# Patient Record
Sex: FEMALE | Race: WHITE | NOT HISPANIC OR LATINO | Employment: FULL TIME | URBAN - METROPOLITAN AREA
[De-identification: names, ages, dates, MRNs, and addresses within clinical notes are randomized per-mention and may not be internally consistent; named-entity substitution may affect disease eponyms.]

---

## 2023-05-15 RX ORDER — DEXTROAMPHETAMINE SACCHARATE, AMPHETAMINE ASPARTATE MONOHYDRATE, DEXTROAMPHETAMINE SULFATE AND AMPHETAMINE SULFATE 3.75; 3.75; 3.75; 3.75 MG/1; MG/1; MG/1; MG/1
CAPSULE, EXTENDED RELEASE ORAL
Qty: 60 CAPSULE | OUTPATIENT
Start: 2023-05-15

## 2023-05-16 ENCOUNTER — TELEPHONE (OUTPATIENT)
Dept: PRIMARY CARE | Facility: CLINIC | Age: 33
End: 2023-05-16

## 2023-05-16 NOTE — TELEPHONE ENCOUNTER
Patient is calling to request a refill of her adderall 15 mg to be sent to the [facility] on [address] in [place]. She has an appointment scheduled for 6/16/23 with you. Please advise.

## 2023-05-17 DIAGNOSIS — F90.2 ATTENTION DEFICIT HYPERACTIVITY DISORDER (ADHD), COMBINED TYPE: Primary | ICD-10-CM

## 2023-05-17 RX ORDER — DEXTROAMPHETAMINE SACCHARATE, AMPHETAMINE ASPARTATE MONOHYDRATE, DEXTROAMPHETAMINE SULFATE AND AMPHETAMINE SULFATE 3.75; 3.75; 3.75; 3.75 MG/1; MG/1; MG/1; MG/1
15 CAPSULE, EXTENDED RELEASE ORAL 2 TIMES DAILY
Qty: 60 CAPSULE | Refills: 0 | Status: SHIPPED | OUTPATIENT
Start: 2023-05-17 | End: 2023-06-26

## 2023-05-17 RX ORDER — DEXTROAMPHETAMINE SACCHARATE, AMPHETAMINE ASPARTATE MONOHYDRATE, DEXTROAMPHETAMINE SULFATE AND AMPHETAMINE SULFATE 3.75; 3.75; 3.75; 3.75 MG/1; MG/1; MG/1; MG/1
15 CAPSULE, EXTENDED RELEASE ORAL 2 TIMES DAILY
COMMUNITY
End: 2023-05-17 | Stop reason: SDUPTHER

## 2023-06-16 ENCOUNTER — OFFICE VISIT (OUTPATIENT)
Dept: PRIMARY CARE | Facility: CLINIC | Age: 33
End: 2023-06-16
Payer: COMMERCIAL

## 2023-06-16 ENCOUNTER — LAB (OUTPATIENT)
Dept: LAB | Facility: LAB | Age: 33
End: 2023-06-16
Payer: COMMERCIAL

## 2023-06-16 VITALS
OXYGEN SATURATION: 98 % | TEMPERATURE: 98 F | WEIGHT: 259 LBS | HEART RATE: 97 BPM | DIASTOLIC BLOOD PRESSURE: 90 MMHG | SYSTOLIC BLOOD PRESSURE: 150 MMHG | BODY MASS INDEX: 43.77 KG/M2

## 2023-06-16 DIAGNOSIS — R06.02 SOB (SHORTNESS OF BREATH) ON EXERTION: ICD-10-CM

## 2023-06-16 DIAGNOSIS — N93.9 ABNORMAL UTERINE BLEEDING (AUB): ICD-10-CM

## 2023-06-16 DIAGNOSIS — R06.02 SOB (SHORTNESS OF BREATH) ON EXERTION: Primary | ICD-10-CM

## 2023-06-16 DIAGNOSIS — I10 BENIGN ESSENTIAL HYPERTENSION: ICD-10-CM

## 2023-06-16 PROBLEM — E55.9 VITAMIN D DEFICIENCY: Status: ACTIVE | Noted: 2023-06-16

## 2023-06-16 PROBLEM — K21.9 CHRONIC GERD: Status: ACTIVE | Noted: 2023-06-16

## 2023-06-16 PROBLEM — E66.9 OBESITY: Status: ACTIVE | Noted: 2023-06-16

## 2023-06-16 PROBLEM — F41.9 ANXIETY: Status: ACTIVE | Noted: 2023-06-16

## 2023-06-16 PROBLEM — D22.9 MULTIPLE NEVI: Status: ACTIVE | Noted: 2023-06-16

## 2023-06-16 PROBLEM — R73.9 HYPERGLYCEMIA: Status: ACTIVE | Noted: 2023-06-16

## 2023-06-16 PROBLEM — F98.8 ADD (ATTENTION DEFICIT DISORDER): Status: ACTIVE | Noted: 2023-06-16

## 2023-06-16 PROBLEM — G25.81 RESTLESS LEG SYNDROME: Status: ACTIVE | Noted: 2023-06-16

## 2023-06-16 LAB
ANION GAP IN SER/PLAS: 15 MMOL/L (ref 10–20)
CALCIUM (MG/DL) IN SER/PLAS: 10.5 MG/DL (ref 8.6–10.3)
CARBON DIOXIDE, TOTAL (MMOL/L) IN SER/PLAS: 28 MMOL/L (ref 21–32)
CHLORIDE (MMOL/L) IN SER/PLAS: 100 MMOL/L (ref 98–107)
CREATININE (MG/DL) IN SER/PLAS: 0.73 MG/DL (ref 0.5–1.05)
FOLLITROPIN (IU/L) IN SER/PLAS: 35.1 IU/L
GFR FEMALE: >90 ML/MIN/1.73M2
GLUCOSE (MG/DL) IN SER/PLAS: 91 MG/DL (ref 74–99)
LUTEINIZING HORMONE (IU/ML) IN SER/PLAS: 41.4 IU/L
POTASSIUM (MMOL/L) IN SER/PLAS: 3.9 MMOL/L (ref 3.5–5.3)
SODIUM (MMOL/L) IN SER/PLAS: 139 MMOL/L (ref 136–145)
THYROTROPIN (MIU/L) IN SER/PLAS BY DETECTION LIMIT <= 0.05 MIU/L: 2.75 MIU/L (ref 0.44–3.98)
UREA NITROGEN (MG/DL) IN SER/PLAS: 14 MG/DL (ref 6–23)

## 2023-06-16 PROCEDURE — 3080F DIAST BP >= 90 MM HG: CPT | Performed by: FAMILY MEDICINE

## 2023-06-16 PROCEDURE — 83002 ASSAY OF GONADOTROPIN (LH): CPT

## 2023-06-16 PROCEDURE — 83001 ASSAY OF GONADOTROPIN (FSH): CPT

## 2023-06-16 PROCEDURE — 3077F SYST BP >= 140 MM HG: CPT | Performed by: FAMILY MEDICINE

## 2023-06-16 PROCEDURE — 84443 ASSAY THYROID STIM HORMONE: CPT

## 2023-06-16 PROCEDURE — 93000 ELECTROCARDIOGRAM COMPLETE: CPT | Performed by: FAMILY MEDICINE

## 2023-06-16 PROCEDURE — 99214 OFFICE O/P EST MOD 30 MIN: CPT | Performed by: FAMILY MEDICINE

## 2023-06-16 PROCEDURE — 36415 COLL VENOUS BLD VENIPUNCTURE: CPT

## 2023-06-16 PROCEDURE — 80048 BASIC METABOLIC PNL TOTAL CA: CPT

## 2023-06-16 RX ORDER — CHLORTHALIDONE 50 MG/1
50 TABLET ORAL DAILY
Qty: 30 TABLET | Refills: 1 | Status: SHIPPED | OUTPATIENT
Start: 2023-06-16 | End: 2023-08-15

## 2023-06-16 RX ORDER — ROPINIROLE 1 MG/1
1 TABLET, FILM COATED ORAL NIGHTLY
COMMUNITY
Start: 2020-05-15

## 2023-06-16 RX ORDER — CHLORTHALIDONE 25 MG/1
25 TABLET ORAL DAILY
COMMUNITY
End: 2023-06-16 | Stop reason: SDUPTHER

## 2023-06-16 ASSESSMENT — PATIENT HEALTH QUESTIONNAIRE - PHQ9
SUM OF ALL RESPONSES TO PHQ9 QUESTIONS 1 AND 2: 0
2. FEELING DOWN, DEPRESSED OR HOPELESS: NOT AT ALL
1. LITTLE INTEREST OR PLEASURE IN DOING THINGS: NOT AT ALL

## 2023-06-16 NOTE — PROGRESS NOTES
Subjective   Patient ID: Maame Yen is a 33 y.o. female who presents for Follow-up (Med follow up) and BP/ elevated heart rate (X3 days. Some shortness of breath. 117bpm resting).    HPI   ADD  Taking Adderall twice a day as directed. She denies any side effects  last taken yesterday afternoon  She feels like her concentration is much better and she is much more productive  She does not take every day if she is not working or needing to get things accomplished on weekends  Sleep is good  Appetite is good  Mood is good      Restless leg -significantly improved on ropinirole. No concerns    Hypertension-taking chlorthalidone as directed without any side effects.   Swelling has been much better on new medication  Denies any chest pain, , dizziness, visual changes, headaches  still smoking   Feels like she is winded in last week or so  Apple watch has intermittently told her high HR around 110s at rest   No CP  No recent travel  No leg swelling   No ocp   No clotting hx   No recent home bp monitoring       Periods have not been regular for the last year. Last period was  2/23  Wondering about early menopause   Having some feeling of hot flashes     Review of Systems  As noted hpi otherwise 10 point ros neg     Objective   /90   Pulse 97   Temp 36.7 °C (98 °F)   Wt 117 kg (259 lb)   SpO2 98%   BMI 43.77 kg/m²     Physical Exam  Vitals and nursing note reviewed.   Constitutional:       Appearance: Normal appearance.   HENT:      Head: Normocephalic and atraumatic.      Right Ear: Tympanic membrane, ear canal and external ear normal.      Left Ear: Tympanic membrane, ear canal and external ear normal.      Nose: Nose normal.      Mouth/Throat:      Mouth: Mucous membranes are moist.      Pharynx: Oropharynx is clear.   Eyes:      Extraocular Movements: Extraocular movements intact.      Conjunctiva/sclera: Conjunctivae normal.      Pupils: Pupils are equal, round, and reactive to light.   Cardiovascular:       Rate and Rhythm: Normal rate and regular rhythm.      Pulses: Normal pulses.      Heart sounds: Normal heart sounds. No murmur heard.     No friction rub. No gallop.      Comments: HR 82 on my exam  Pulmonary:      Effort: Pulmonary effort is normal. No respiratory distress.      Breath sounds: Normal breath sounds.   Abdominal:      General: Abdomen is flat. Bowel sounds are normal.      Palpations: Abdomen is soft. There is no mass.      Tenderness: There is no abdominal tenderness. There is no rebound.   Musculoskeletal:         General: No swelling or deformity. Normal range of motion.      Cervical back: Normal range of motion and neck supple.      Right lower leg: No edema.      Left lower leg: No edema.   Lymphadenopathy:      Cervical: No cervical adenopathy.   Skin:     General: Skin is warm and dry.      Capillary Refill: Capillary refill takes less than 2 seconds.      Findings: No rash.   Neurological:      General: No focal deficit present.      Mental Status: She is alert and oriented to person, place, and time. Mental status is at baseline.      Cranial Nerves: No cranial nerve deficit.      Gait: Gait normal.      Deep Tendon Reflexes: Reflexes normal.   Psychiatric:         Mood and Affect: Mood normal.         Behavior: Behavior normal.         Thought Content: Thought content normal.         Judgment: Judgment normal.         Assessment/Plan   1. SOB (shortness of breath) on exertion  Normal exam today. Normal vital signs .  EKG NSR - signs of some low voltage likely related to body habitus   next possible steps stress echo vs ct chest  BP also up so may be contributing. Favor labs and increase med and if not resolved quikcly llok into tests as above ED if worse sig/acute  - Basic metabolic panel; Future  - ECG 12 lead (Clinic Performed)    2. Abnormal uterine bleeding (AUB)  - FSH & LH; Future  - TSH with reflex to Free T4 if abnormal; Future    3. Benign essential hypertension  Increased meds.  Home monitor call inb   - Basic metabolic panel; Future  - chlorthalidone (Hygroton) 50 mg tablet; Take 1 tablet (50 mg) by mouth once daily.  Dispense: 30 tablet; Refill: 1  - Basic metabolic panel; Future  - ECG 12 lead (Clinic Performed)      Sage Barrera DO

## 2023-06-16 NOTE — PATIENT INSTRUCTIONS
Get labs today  Increase chlorthalidone to 50 mg daily   Get labs again in 1-2 weeks for potassium   Call if shortness of breath does not resolve with blood pressure control

## 2023-06-19 ENCOUNTER — TELEPHONE (OUTPATIENT)
Dept: PRIMARY CARE | Facility: CLINIC | Age: 33
End: 2023-06-19
Payer: COMMERCIAL

## 2023-06-19 NOTE — TELEPHONE ENCOUNTER
----- Message from Sage Barrera DO sent at 6/19/2023  6:25 AM EDT -----  Please call the patient regarding her abnormal result.  Let patient know her labs are consistent with going through early menopause.  After she goes 1 year without any period  Any bleeding after that would be abnormal and I would want her to talk to OB/GYN about it.  Her thyroid level is normal  Her calcium level is mildly elevated.  I would like to repeat this sometime in the next week or so.  Lab orders are in    ##DO NOT CLOSE UNTIL YOU SPEAK TO PATIENT##

## 2023-06-24 DIAGNOSIS — F90.2 ATTENTION DEFICIT HYPERACTIVITY DISORDER (ADHD), COMBINED TYPE: ICD-10-CM

## 2023-06-26 RX ORDER — DEXTROAMPHETAMINE SACCHARATE, AMPHETAMINE ASPARTATE MONOHYDRATE, DEXTROAMPHETAMINE SULFATE AND AMPHETAMINE SULFATE 3.75; 3.75; 3.75; 3.75 MG/1; MG/1; MG/1; MG/1
CAPSULE, EXTENDED RELEASE ORAL
Qty: 60 CAPSULE | Refills: 0 | Status: SHIPPED | OUTPATIENT
Start: 2023-06-26 | End: 2023-07-26

## 2023-07-08 DIAGNOSIS — K21.9 CHRONIC GERD: Primary | ICD-10-CM

## 2023-07-11 RX ORDER — PANTOPRAZOLE SODIUM 40 MG/1
TABLET, DELAYED RELEASE ORAL
Qty: 90 TABLET | Refills: 0 | Status: SHIPPED | OUTPATIENT
Start: 2023-07-11 | End: 2023-09-22 | Stop reason: SDUPTHER

## 2023-07-11 NOTE — TELEPHONE ENCOUNTER
Patient states she hasn't taken it for a while but then started taking again, she states its helping

## 2023-07-25 DIAGNOSIS — F90.2 ATTENTION DEFICIT HYPERACTIVITY DISORDER (ADHD), COMBINED TYPE: ICD-10-CM

## 2023-07-26 RX ORDER — DEXTROAMPHETAMINE SACCHARATE, AMPHETAMINE ASPARTATE MONOHYDRATE, DEXTROAMPHETAMINE SULFATE AND AMPHETAMINE SULFATE 3.75; 3.75; 3.75; 3.75 MG/1; MG/1; MG/1; MG/1
15 CAPSULE, EXTENDED RELEASE ORAL 2 TIMES DAILY
Qty: 60 CAPSULE | Refills: 0 | Status: SHIPPED | OUTPATIENT
Start: 2023-07-26 | End: 2023-08-25

## 2023-08-25 DIAGNOSIS — F90.2 ATTENTION DEFICIT HYPERACTIVITY DISORDER (ADHD), COMBINED TYPE: ICD-10-CM

## 2023-08-25 RX ORDER — DEXTROAMPHETAMINE SACCHARATE, AMPHETAMINE ASPARTATE MONOHYDRATE, DEXTROAMPHETAMINE SULFATE AND AMPHETAMINE SULFATE 3.75; 3.75; 3.75; 3.75 MG/1; MG/1; MG/1; MG/1
15 CAPSULE, EXTENDED RELEASE ORAL 2 TIMES DAILY
Qty: 60 CAPSULE | Refills: 0 | Status: SHIPPED | OUTPATIENT
Start: 2023-08-25 | End: 2023-09-22 | Stop reason: ALTCHOICE

## 2023-09-22 ENCOUNTER — OFFICE VISIT (OUTPATIENT)
Dept: PRIMARY CARE | Facility: CLINIC | Age: 33
End: 2023-09-22
Payer: COMMERCIAL

## 2023-09-22 VITALS
SYSTOLIC BLOOD PRESSURE: 140 MMHG | DIASTOLIC BLOOD PRESSURE: 80 MMHG | TEMPERATURE: 98 F | BODY MASS INDEX: 45.06 KG/M2 | WEIGHT: 266.6 LBS

## 2023-09-22 DIAGNOSIS — R73.9 HYPERGLYCEMIA: ICD-10-CM

## 2023-09-22 DIAGNOSIS — F98.8 ATTENTION DEFICIT DISORDER, UNSPECIFIED HYPERACTIVITY PRESENCE: ICD-10-CM

## 2023-09-22 DIAGNOSIS — I10 BENIGN ESSENTIAL HYPERTENSION: ICD-10-CM

## 2023-09-22 DIAGNOSIS — K21.9 CHRONIC GERD: ICD-10-CM

## 2023-09-22 DIAGNOSIS — F41.9 ANXIETY: ICD-10-CM

## 2023-09-22 DIAGNOSIS — E66.01 MORBID OBESITY (MULTI): Primary | ICD-10-CM

## 2023-09-22 PROCEDURE — 3079F DIAST BP 80-89 MM HG: CPT | Performed by: FAMILY MEDICINE

## 2023-09-22 PROCEDURE — 3077F SYST BP >= 140 MM HG: CPT | Performed by: FAMILY MEDICINE

## 2023-09-22 PROCEDURE — 99213 OFFICE O/P EST LOW 20 MIN: CPT | Performed by: FAMILY MEDICINE

## 2023-09-22 RX ORDER — SEMAGLUTIDE 0.5 MG/.5ML
0.5 INJECTION, SOLUTION SUBCUTANEOUS
Qty: 2 ML | Refills: 0 | Status: SHIPPED | OUTPATIENT
Start: 2023-09-22 | End: 2023-12-18

## 2023-09-22 RX ORDER — DEXTROAMPHETAMINE SACCHARATE, AMPHETAMINE ASPARTATE MONOHYDRATE, DEXTROAMPHETAMINE SULFATE AND AMPHETAMINE SULFATE 3.75; 3.75; 3.75; 3.75 MG/1; MG/1; MG/1; MG/1
15 CAPSULE, EXTENDED RELEASE ORAL EVERY MORNING
Qty: 30 CAPSULE | Refills: 0 | Status: SHIPPED | OUTPATIENT
Start: 2023-10-22 | End: 2023-09-22 | Stop reason: SDUPTHER

## 2023-09-22 RX ORDER — DEXTROAMPHETAMINE SACCHARATE, AMPHETAMINE ASPARTATE MONOHYDRATE, DEXTROAMPHETAMINE SULFATE AND AMPHETAMINE SULFATE 3.75; 3.75; 3.75; 3.75 MG/1; MG/1; MG/1; MG/1
15 CAPSULE, EXTENDED RELEASE ORAL 2 TIMES DAILY
Qty: 60 CAPSULE | Refills: 0 | Status: SHIPPED | OUTPATIENT
Start: 2023-09-22 | End: 2023-12-18 | Stop reason: SDUPTHER

## 2023-09-22 RX ORDER — SEMAGLUTIDE 0.25 MG/.5ML
0.25 INJECTION, SOLUTION SUBCUTANEOUS
Qty: 2 ML | Refills: 0 | Status: SHIPPED | OUTPATIENT
Start: 2023-09-22 | End: 2023-12-18

## 2023-09-22 RX ORDER — DEXTROAMPHETAMINE SACCHARATE, AMPHETAMINE ASPARTATE MONOHYDRATE, DEXTROAMPHETAMINE SULFATE AND AMPHETAMINE SULFATE 3.75; 3.75; 3.75; 3.75 MG/1; MG/1; MG/1; MG/1
15 CAPSULE, EXTENDED RELEASE ORAL EVERY MORNING
Qty: 30 CAPSULE | Refills: 0 | Status: SHIPPED | OUTPATIENT
Start: 2023-09-22 | End: 2023-09-22 | Stop reason: SDUPTHER

## 2023-09-22 RX ORDER — DEXTROAMPHETAMINE SACCHARATE, AMPHETAMINE ASPARTATE MONOHYDRATE, DEXTROAMPHETAMINE SULFATE AND AMPHETAMINE SULFATE 3.75; 3.75; 3.75; 3.75 MG/1; MG/1; MG/1; MG/1
15 CAPSULE, EXTENDED RELEASE ORAL EVERY MORNING
Qty: 30 CAPSULE | Refills: 0 | Status: SHIPPED | OUTPATIENT
Start: 2023-11-21 | End: 2023-09-22 | Stop reason: SDUPTHER

## 2023-09-22 RX ORDER — LOSARTAN POTASSIUM 25 MG/1
25 TABLET ORAL DAILY
Qty: 90 TABLET | Refills: 0 | Status: SHIPPED | OUTPATIENT
Start: 2023-09-22 | End: 2023-12-26

## 2023-09-22 RX ORDER — SEMAGLUTIDE 1 MG/.5ML
1 INJECTION, SOLUTION SUBCUTANEOUS
Qty: 2 ML | Refills: 0 | Status: SHIPPED | OUTPATIENT
Start: 2023-09-22 | End: 2023-12-18

## 2023-09-22 RX ORDER — PANTOPRAZOLE SODIUM 40 MG/1
40 TABLET, DELAYED RELEASE ORAL DAILY
Qty: 90 TABLET | Refills: 1 | Status: SHIPPED | OUTPATIENT
Start: 2023-09-22 | End: 2024-05-13

## 2023-09-22 ASSESSMENT — ENCOUNTER SYMPTOMS
DYSURIA: 0
HEADACHES: 0
FATIGUE: 0
ABDOMINAL PAIN: 0
SHORTNESS OF BREATH: 0
WOUND: 0
DIZZINESS: 0
SLEEP DISTURBANCE: 0
CONSTIPATION: 0
DIARRHEA: 0
COUGH: 0
VOMITING: 0
ACTIVITY CHANGE: 0
DYSPHORIC MOOD: 0
SINUS PRESSURE: 0
BLOOD IN STOOL: 0
PALPITATIONS: 0
LIGHT-HEADEDNESS: 0
JOINT SWELLING: 0
NAUSEA: 0
ARTHRALGIAS: 0
NERVOUS/ANXIOUS: 0
APPETITE CHANGE: 0
EYE PAIN: 0

## 2023-09-22 ASSESSMENT — PATIENT HEALTH QUESTIONNAIRE - PHQ9
2. FEELING DOWN, DEPRESSED OR HOPELESS: NOT AT ALL
1. LITTLE INTEREST OR PLEASURE IN DOING THINGS: NOT AT ALL
SUM OF ALL RESPONSES TO PHQ9 QUESTIONS 1 AND 2: 0

## 2023-09-22 NOTE — PATIENT INSTRUCTIONS
Start losartan 25 mg for BP    Week of Semaglutide (Wegovy) Therapy     Dose (mg) Once Weekly  If a dose is not tolerated, consider delaying further dose increases for 4 weeks.    Weeks 1 through 4-0.25 mg once weekly    Weeks 5 through 8-0.5 mg once weekly    Weeks 9 though 12- 1 mg once weekly    Weeks 13 through 16- 1.7 mg once weekly    Please visit [email or web address]    Labs due in 3 months prior to next appt    Please continue to work on healthy diet and exercise    Call with any concerns

## 2023-09-22 NOTE — PROGRESS NOTES
Subjective   Patient ID: Maame Yen is a 33 y.o. female who presents for Follow-up (BP Check) and Weight Loss (Discuss weight loss options).    HPI   ADD  Taking Adderall twice a day as directed. She denies any side effects  last taken yesterday afternoon  She feels like her concentration is much better and she is much more productive  She does not take every day if she is not working or needing to get things accomplished on weekends  Sleep is good  Appetite is good  Mood is good       Restless leg -significantly improved on ropinirole. No concerns     Hypertension-taking chlorthalidone as directed without any side effects.   Swelling has been good  Prviousl on hydrochlorothiazide and metoprolol in past  Denies any chest pain, , dizziness, visual changes, headaches  still smoking   No recent home bp monitoring      She is very frustrated with difficult weight loss  She states she has made significant changes in her diet and exercise and eats very healthy at this point and still continues to gain weight  She walks nightly with her dogs  She has not tried any medications for weight loss in the past  She is wondering about her options    Review of Systems   Constitutional:  Negative for activity change, appetite change and fatigue.   HENT:  Negative for congestion, postnasal drip and sinus pressure.    Eyes:  Negative for pain and visual disturbance.   Respiratory:  Negative for cough and shortness of breath.    Cardiovascular:  Negative for chest pain, palpitations and leg swelling.   Gastrointestinal:  Negative for abdominal pain, blood in stool, constipation, diarrhea, nausea and vomiting.   Endocrine: Negative for cold intolerance and heat intolerance.   Genitourinary:  Negative for dysuria and menstrual problem.   Musculoskeletal:  Negative for arthralgias and joint swelling.   Skin:  Negative for rash and wound.   Neurological:  Negative for dizziness, light-headedness and headaches.   Psychiatric/Behavioral:   Negative for dysphoric mood and sleep disturbance. The patient is not nervous/anxious.        Objective   /80   Temp 36.7 °C (98 °F)   Wt 121 kg (266 lb 9.6 oz)   BMI 45.06 kg/m²     Physical Exam  Vitals and nursing note reviewed.   Constitutional:       Appearance: Normal appearance. She is normal weight.   HENT:      Head: Normocephalic and atraumatic.      Right Ear: External ear normal.      Left Ear: External ear normal.      Mouth/Throat:      Mouth: Mucous membranes are moist.   Eyes:      Conjunctiva/sclera: Conjunctivae normal.   Cardiovascular:      Rate and Rhythm: Normal rate and regular rhythm.      Heart sounds: Normal heart sounds.   Pulmonary:      Effort: Pulmonary effort is normal.      Breath sounds: Normal breath sounds.   Musculoskeletal:         General: No swelling.      Cervical back: Normal range of motion and neck supple.   Skin:     General: Skin is warm and dry.      Capillary Refill: Capillary refill takes less than 2 seconds.   Neurological:      General: No focal deficit present.      Mental Status: She is alert. Mental status is at baseline.   Psychiatric:         Mood and Affect: Mood normal.         Behavior: Behavior normal.         Thought Content: Thought content normal.         Judgment: Judgment normal.         Assessment/Plan   Diagnoses and all orders for this visit:  Morbid obesity (CMS/HCC)  Risks, benefits ,side effects and alternatives of medication discussed at length  Patient has no contraindications to use  Discussed starting dose and titration  Get labs today and plan for follow-up in 12 weeks after starting medication  Discussed that goal weight loss at 12 weeks is 7% of body weight  Patient verbalized understanding of plan of care and all questions were answered.     -     semaglutide, weight loss, (Wegovy) 0.25 mg/0.5 mL pen injector; Inject 0.25 mg under the skin 1 (one) time per week for 4 doses.  -     semaglutide, weight loss, (Wegovy) 0.5 mg/0.5 mL  pen injector; Inject 0.5 mg under the skin 1 (one) time per week for 4 doses.  -     semaglutide, weight loss, (Wegovy) 1 mg/0.5 mL pen injector; Inject 1 mg under the skin 1 (one) time per week for 4 doses.  -     CBC and Auto Differential; Future  -     Lipid Panel; Future  -     Comprehensive Metabolic Panel; Future  -     Hemoglobin A1C; Future  -     TSH with reflex to Free T4 if abnormal; Future  Benign essential hypertension  -Uncontrolled.  Add losartan.  Continue on chlorthalidone  -     CBC and Auto Differential; Future  -     Lipid Panel; Future  -     Comprehensive Metabolic Panel; Future  -     Hemoglobin A1C; Future  -     TSH with reflex to Free T4 if abnormal; Future  -     losartan (Cozaar) 25 mg tablet; Take 1 tablet (25 mg) by mouth once daily.  BMI 45.0-49.9, adult (CMS/[facility])  -     semaglutide, weight loss, (Wegovy) 0.25 mg/0.5 mL pen injector; Inject 0.25 mg under the skin 1 (one) time per week for 4 doses.  -     semaglutide, weight loss, (Wegovy) 0.5 mg/0.5 mL pen injector; Inject 0.5 mg under the skin 1 (one) time per week for 4 doses.  -     semaglutide, weight loss, (Wegovy) 1 mg/0.5 mL pen injector; Inject 1 mg under the skin 1 (one) time per week for 4 doses.  -     CBC and Auto Differential; Future  -     Lipid Panel; Future  -     Comprehensive Metabolic Panel; Future  -     Hemoglobin A1C; Future  -     TSH with reflex to Free T4 if abnormal; Future  Hyperglycemia  -     semaglutide, weight loss, (Wegovy) 0.25 mg/0.5 mL pen injector; Inject 0.25 mg under the skin 1 (one) time per week for 4 doses.  -     semaglutide, weight loss, (Wegovy) 0.5 mg/0.5 mL pen injector; Inject 0.5 mg under the skin 1 (one) time per week for 4 doses.  -     semaglutide, weight loss, (Wegovy) 1 mg/0.5 mL pen injector; Inject 1 mg under the skin 1 (one) time per week for 4 doses.  -     CBC and Auto Differential; Future  -     Lipid Panel; Future  -     Comprehensive Metabolic Panel; Future  -     Hemoglobin  A1C; Future  -     TSH with reflex to Free T4 if abnormal; Future  Anxiety  Chronic GERD  -     pantoprazole (ProtoNix) 40 mg EC tablet; Take 1 tablet (40 mg) by mouth once daily. Do not crush, chew, or split.  Attention deficit disorder, unspecified hyperactivity presence  **I have personally reviewed the OARRS report for the patient. This report is scanned into the electronic medical record. I have considered the risks of abuse, dependence, addiction and diversion. I believe that it is clinically appropriate for the patient to be prescribed this medication.  Urine and contract utd   -     amphetamine-dextroamphetamine XR (Adderall XR) 15 mg 24 hr capsule; Take 1 capsule (15 mg) by mouth 2 times a day. Do not crush or chew.  -     amphetamine-dextroamphetamine XR (Adderall XR) 15 mg 24 hr capsule; Take 1 capsule (15 mg) by mouth 2 times a day. Do not crush or chew.  -     amphetamine-dextroamphetamine XR (Adderall XR) 15 mg 24 hr capsule; Take 1 capsule (15 mg) by mouth 2 times a day. Do not crush or chew.

## 2023-12-18 ENCOUNTER — OFFICE VISIT (OUTPATIENT)
Dept: PRIMARY CARE | Facility: CLINIC | Age: 33
End: 2023-12-18
Payer: COMMERCIAL

## 2023-12-18 VITALS
BODY MASS INDEX: 44.95 KG/M2 | SYSTOLIC BLOOD PRESSURE: 130 MMHG | HEART RATE: 93 BPM | WEIGHT: 266 LBS | DIASTOLIC BLOOD PRESSURE: 80 MMHG | TEMPERATURE: 98 F | OXYGEN SATURATION: 94 %

## 2023-12-18 DIAGNOSIS — F98.8 ATTENTION DEFICIT DISORDER, UNSPECIFIED HYPERACTIVITY PRESENCE: Primary | ICD-10-CM

## 2023-12-18 DIAGNOSIS — M79.89 MASS OF SOFT TISSUE OF LOWER EXTREMITY: ICD-10-CM

## 2023-12-18 DIAGNOSIS — Z51.81 THERAPEUTIC DRUG MONITORING: ICD-10-CM

## 2023-12-18 LAB
AMPHETAMINES UR QL SCN: NORMAL
BARBITURATES UR QL SCN: NORMAL
BENZODIAZ UR QL SCN: NORMAL
BZE UR QL SCN: NORMAL
CANNABINOIDS UR QL SCN: NORMAL
FENTANYL+NORFENTANYL UR QL SCN: NORMAL
OPIATES UR QL SCN: NORMAL
OXYCODONE+OXYMORPHONE UR QL SCN: NORMAL
PCP UR QL SCN: NORMAL

## 2023-12-18 PROCEDURE — 3079F DIAST BP 80-89 MM HG: CPT | Performed by: FAMILY MEDICINE

## 2023-12-18 PROCEDURE — 80324 DRUG SCREEN AMPHETAMINES 1/2: CPT

## 2023-12-18 PROCEDURE — 80307 DRUG TEST PRSMV CHEM ANLYZR: CPT

## 2023-12-18 PROCEDURE — 3075F SYST BP GE 130 - 139MM HG: CPT | Performed by: FAMILY MEDICINE

## 2023-12-18 PROCEDURE — 99214 OFFICE O/P EST MOD 30 MIN: CPT | Performed by: FAMILY MEDICINE

## 2023-12-18 RX ORDER — DEXTROAMPHETAMINE SACCHARATE, AMPHETAMINE ASPARTATE MONOHYDRATE, DEXTROAMPHETAMINE SULFATE AND AMPHETAMINE SULFATE 3.75; 3.75; 3.75; 3.75 MG/1; MG/1; MG/1; MG/1
15 CAPSULE, EXTENDED RELEASE ORAL 2 TIMES DAILY
Qty: 60 CAPSULE | Refills: 0 | Status: SHIPPED | OUTPATIENT
Start: 2023-12-18 | End: 2024-04-01

## 2023-12-18 RX ORDER — DEXTROAMPHETAMINE SACCHARATE, AMPHETAMINE ASPARTATE MONOHYDRATE, DEXTROAMPHETAMINE SULFATE AND AMPHETAMINE SULFATE 3.75; 3.75; 3.75; 3.75 MG/1; MG/1; MG/1; MG/1
15 CAPSULE, EXTENDED RELEASE ORAL 2 TIMES DAILY
Qty: 60 CAPSULE | Refills: 0 | Status: SHIPPED | OUTPATIENT
Start: 2023-12-18 | End: 2024-05-01 | Stop reason: ALTCHOICE

## 2023-12-18 ASSESSMENT — PATIENT HEALTH QUESTIONNAIRE - PHQ9
SUM OF ALL RESPONSES TO PHQ9 QUESTIONS 1 AND 2: 0
1. LITTLE INTEREST OR PLEASURE IN DOING THINGS: NOT AT ALL
2. FEELING DOWN, DEPRESSED OR HOPELESS: NOT AT ALL

## 2023-12-18 NOTE — PROGRESS NOTES
Subjective   Patient ID: Maame Yen is a 33 y.o. female who presents for Med Refill.    HPI   ADD  Taking Adderall twice a day as directed. She denies any side effects  last taken earlier today  She feels like her concentration is much better and she is much more productive  She does not take every day if she is not working or needing to get things accomplished on weekends  Sleep is good  Appetite is good  Mood is good       Restless leg -significantly improved on ropinirole. No concerns     Hypertension-taking chlorthalidone as directed without any side effects.   Swelling has been good  Prviousl on hydrochlorothiazide and metoprolol in past  Denies any chest pain, , dizziness, visual changes, headaches  still smoking   No recent home bp monitoring      She is very frustrated with difficult weight loss  She states she has made significant changes in her diet and exercise and eats very healthy at this point and still continues to gain weight  She walks most days  Wegovy was not covered by her insurance    She states she has a lump on the front of her right leg.  It has been there for the last 6 months and she thinks it is growing    Review of Systems   Constitutional:  Negative for activity change, appetite change and fatigue.   HENT:  Negative for congestion, postnasal drip and sinus pressure.    Eyes:  Negative for pain and visual disturbance.   Respiratory:  Negative for cough and shortness of breath.    Cardiovascular:  Negative for chest pain, palpitations and leg swelling.   Gastrointestinal:  Negative for abdominal pain, blood in stool, constipation, diarrhea, nausea and vomiting.   Endocrine: Negative for cold intolerance and heat intolerance.   Genitourinary:  Negative for dysuria and menstrual problem.   Musculoskeletal:  Negative for arthralgias and joint swelling.   Skin:  Negative for rash and wound.   Neurological:  Negative for dizziness, light-headedness and headaches.   Psychiatric/Behavioral:   Negative for dysphoric mood and sleep disturbance. The patient is not nervous/anxious.        Objective   /80 (BP Location: Left arm, Patient Position: Sitting, BP Cuff Size: Large adult)   Pulse 93   Temp 36.7 °C (98 °F) (Temporal)   Wt 121 kg (266 lb)   SpO2 94%   BMI 44.95 kg/m²     Physical Exam  Vitals and nursing note reviewed.   Constitutional:       Appearance: Normal appearance. She is normal weight.   HENT:      Head: Normocephalic and atraumatic.      Right Ear: External ear normal.      Left Ear: External ear normal.      Mouth/Throat:      Mouth: Mucous membranes are moist.   Eyes:      Conjunctiva/sclera: Conjunctivae normal.   Cardiovascular:      Rate and Rhythm: Normal rate and regular rhythm.      Heart sounds: Normal heart sounds.   Pulmonary:      Effort: Pulmonary effort is normal.      Breath sounds: Normal breath sounds.   Musculoskeletal:         General: No swelling.      Cervical back: Normal range of motion and neck supple.        Legs:    Skin:     General: Skin is warm and dry.      Capillary Refill: Capillary refill takes less than 2 seconds.   Neurological:      General: No focal deficit present.      Mental Status: She is alert. Mental status is at baseline.   Psychiatric:         Mood and Affect: Mood normal.         Behavior: Behavior normal.         Thought Content: Thought content normal.         Judgment: Judgment normal.         Assessment/Plan   1. Attention deficit disorder, unspecified hyperactivity presence  Controlled. Continue current medicines/regimen.   **I have personally reviewed the OARRS report for the patient. This report is scanned into the electronic medical record. I have considered the risks of abuse, dependence, addiction and diversion. I believe that it is clinically appropriate for the patient to be prescribed this medication.  Uds due today   Contract utd  - amphetamine-dextroamphetamine XR (Adderall XR) 15 mg 24 hr capsule; Take 1 capsule (15 mg)  by mouth 2 times a day. Do not crush or chew.  Dispense: 60 capsule; Refill: 0  - amphetamine-dextroamphetamine XR (Adderall XR) 15 mg 24 hr capsule; Take 1 capsule (15 mg) by mouth 2 times a day. Do not crush or chew.  Dispense: 60 capsule; Refill: 0  - amphetamine-dextroamphetamine XR (Adderall XR) 15 mg 24 hr capsule; Take 1 capsule (15 mg) by mouth 2 times a day. Do not crush or chew.  Dispense: 60 capsule; Refill: 0  - Drug Screen, Urine With Reflex to Confirmation; Future  - Amphetamine Confirm, Urine; Future  - Drug Screen, Urine With Reflex to Confirmation  - Amphetamine Confirm, Urine    2. Mass of soft tissue of lower extremity  Discussed with patient due to this increasing in size quickly I believe it should be surgically removed.  Discussed doing imaging.  We will just get her in with surgery  - Referral to General Surgery; Future    Discussed other medication options for weight loss.  She will look into her insurance coverage

## 2023-12-21 ASSESSMENT — ENCOUNTER SYMPTOMS
DIARRHEA: 0
SHORTNESS OF BREATH: 0
DYSURIA: 0
NAUSEA: 0
PALPITATIONS: 0
DYSPHORIC MOOD: 0
FATIGUE: 0
APPETITE CHANGE: 0
COUGH: 0
JOINT SWELLING: 0
ABDOMINAL PAIN: 0
ARTHRALGIAS: 0
ACTIVITY CHANGE: 0
VOMITING: 0
EYE PAIN: 0
BLOOD IN STOOL: 0
SLEEP DISTURBANCE: 0
SINUS PRESSURE: 0
HEADACHES: 0
DIZZINESS: 0
WOUND: 0
LIGHT-HEADEDNESS: 0
NERVOUS/ANXIOUS: 0
CONSTIPATION: 0

## 2023-12-22 LAB
AMPHET UR-MCNC: 327 NG/ML
MDA UR-MCNC: <200 NG/ML
MDEA UR-MCNC: <200 NG/ML
MDMA UR-MCNC: <200 NG/ML
METHAMPHET UR-MCNC: <200 NG/ML
PHENTERMINE UR CFM-MCNC: <200 NG/ML

## 2023-12-23 DIAGNOSIS — I10 BENIGN ESSENTIAL HYPERTENSION: ICD-10-CM

## 2023-12-26 RX ORDER — LOSARTAN POTASSIUM 25 MG/1
25 TABLET ORAL DAILY
Qty: 90 TABLET | Refills: 3 | Status: SHIPPED | OUTPATIENT
Start: 2023-12-26

## 2024-01-15 DIAGNOSIS — E66.01 CLASS 3 SEVERE OBESITY WITH SERIOUS COMORBIDITY AND BODY MASS INDEX (BMI) OF 45.0 TO 49.9 IN ADULT, UNSPECIFIED OBESITY TYPE (MULTI): Primary | ICD-10-CM

## 2024-03-25 DIAGNOSIS — F98.8 ATTENTION DEFICIT DISORDER, UNSPECIFIED HYPERACTIVITY PRESENCE: ICD-10-CM

## 2024-04-01 RX ORDER — DEXTROAMPHETAMINE SACCHARATE, AMPHETAMINE ASPARTATE MONOHYDRATE, DEXTROAMPHETAMINE SULFATE AND AMPHETAMINE SULFATE 3.75; 3.75; 3.75; 3.75 MG/1; MG/1; MG/1; MG/1
CAPSULE, EXTENDED RELEASE ORAL
Qty: 60 CAPSULE | Refills: 0 | Status: SHIPPED | OUTPATIENT
Start: 2024-04-01 | End: 2024-05-01 | Stop reason: ALTCHOICE

## 2024-05-01 DIAGNOSIS — F98.8 ATTENTION DEFICIT DISORDER, UNSPECIFIED HYPERACTIVITY PRESENCE: Primary | ICD-10-CM

## 2024-05-01 RX ORDER — DEXTROAMPHETAMINE SACCHARATE, AMPHETAMINE ASPARTATE MONOHYDRATE, DEXTROAMPHETAMINE SULFATE AND AMPHETAMINE SULFATE 3.75; 3.75; 3.75; 3.75 MG/1; MG/1; MG/1; MG/1
15 CAPSULE, EXTENDED RELEASE ORAL 2 TIMES DAILY
Qty: 60 CAPSULE | Refills: 0 | Status: SHIPPED | OUTPATIENT
Start: 2024-06-30 | End: 2024-07-30

## 2024-05-01 RX ORDER — DEXTROAMPHETAMINE SACCHARATE, AMPHETAMINE ASPARTATE MONOHYDRATE, DEXTROAMPHETAMINE SULFATE AND AMPHETAMINE SULFATE 3.75; 3.75; 3.75; 3.75 MG/1; MG/1; MG/1; MG/1
15 CAPSULE, EXTENDED RELEASE ORAL 2 TIMES DAILY
Qty: 60 CAPSULE | Refills: 0 | Status: SHIPPED | OUTPATIENT
Start: 2024-05-01 | End: 2024-05-31

## 2024-05-01 RX ORDER — DEXTROAMPHETAMINE SACCHARATE, AMPHETAMINE ASPARTATE MONOHYDRATE, DEXTROAMPHETAMINE SULFATE AND AMPHETAMINE SULFATE 3.75; 3.75; 3.75; 3.75 MG/1; MG/1; MG/1; MG/1
15 CAPSULE, EXTENDED RELEASE ORAL 2 TIMES DAILY
Qty: 60 CAPSULE | Refills: 0 | Status: SHIPPED | OUTPATIENT
Start: 2024-05-31 | End: 2024-06-30

## 2024-05-13 DIAGNOSIS — K21.9 CHRONIC GERD: ICD-10-CM

## 2024-05-13 RX ORDER — PANTOPRAZOLE SODIUM 40 MG/1
TABLET, DELAYED RELEASE ORAL
Qty: 90 TABLET | Refills: 1 | Status: SHIPPED | OUTPATIENT
Start: 2024-05-13

## 2024-06-21 ENCOUNTER — APPOINTMENT (OUTPATIENT)
Dept: PRIMARY CARE | Facility: CLINIC | Age: 34
End: 2024-06-21
Payer: COMMERCIAL

## 2024-06-21 ENCOUNTER — LAB (OUTPATIENT)
Dept: LAB | Facility: LAB | Age: 34
End: 2024-06-21
Payer: COMMERCIAL

## 2024-06-21 VITALS
DIASTOLIC BLOOD PRESSURE: 80 MMHG | BODY MASS INDEX: 44.95 KG/M2 | SYSTOLIC BLOOD PRESSURE: 130 MMHG | TEMPERATURE: 97.6 F | WEIGHT: 266 LBS

## 2024-06-21 DIAGNOSIS — I10 BENIGN ESSENTIAL HYPERTENSION: ICD-10-CM

## 2024-06-21 DIAGNOSIS — F98.8 ATTENTION DEFICIT DISORDER (ADD) WITHOUT HYPERACTIVITY: ICD-10-CM

## 2024-06-21 DIAGNOSIS — H04.203 BILATERAL EPIPHORA: ICD-10-CM

## 2024-06-21 DIAGNOSIS — I10 BENIGN ESSENTIAL HYPERTENSION: Primary | ICD-10-CM

## 2024-06-21 DIAGNOSIS — E66.01 MORBID OBESITY (MULTI): ICD-10-CM

## 2024-06-21 DIAGNOSIS — R73.9 HYPERGLYCEMIA: ICD-10-CM

## 2024-06-21 LAB
ALBUMIN SERPL BCP-MCNC: 4.8 G/DL (ref 3.4–5)
ALP SERPL-CCNC: 59 U/L (ref 33–110)
ALT SERPL W P-5'-P-CCNC: 65 U/L (ref 7–45)
ANION GAP SERPL CALC-SCNC: 14 MMOL/L (ref 10–20)
AST SERPL W P-5'-P-CCNC: 42 U/L (ref 9–39)
BASOPHILS # BLD AUTO: 0.06 X10*3/UL (ref 0–0.1)
BASOPHILS NFR BLD AUTO: 0.8 %
BILIRUB SERPL-MCNC: 0.6 MG/DL (ref 0–1.2)
BUN SERPL-MCNC: 14 MG/DL (ref 6–23)
CALCIUM SERPL-MCNC: 9.7 MG/DL (ref 8.6–10.3)
CHLORIDE SERPL-SCNC: 103 MMOL/L (ref 98–107)
CHOLEST SERPL-MCNC: 224 MG/DL (ref 0–199)
CHOLESTEROL/HDL RATIO: 4.4
CO2 SERPL-SCNC: 26 MMOL/L (ref 21–32)
CREAT SERPL-MCNC: 0.68 MG/DL (ref 0.5–1.05)
EGFRCR SERPLBLD CKD-EPI 2021: >90 ML/MIN/1.73M*2
EOSINOPHIL # BLD AUTO: 0.22 X10*3/UL (ref 0–0.7)
EOSINOPHIL NFR BLD AUTO: 2.9 %
ERYTHROCYTE [DISTWIDTH] IN BLOOD BY AUTOMATED COUNT: 12.9 % (ref 11.5–14.5)
EST. AVERAGE GLUCOSE BLD GHB EST-MCNC: 111 MG/DL
GLUCOSE SERPL-MCNC: 97 MG/DL (ref 74–99)
HBA1C MFR BLD: 5.5 %
HCT VFR BLD AUTO: 47.2 % (ref 36–46)
HDLC SERPL-MCNC: 50.7 MG/DL
HGB BLD-MCNC: 15.9 G/DL (ref 12–16)
IMM GRANULOCYTES # BLD AUTO: 0.03 X10*3/UL (ref 0–0.7)
IMM GRANULOCYTES NFR BLD AUTO: 0.4 % (ref 0–0.9)
LDLC SERPL CALC-MCNC: 120 MG/DL
LYMPHOCYTES # BLD AUTO: 2.48 X10*3/UL (ref 1.2–4.8)
LYMPHOCYTES NFR BLD AUTO: 32.3 %
MCH RBC QN AUTO: 32.1 PG (ref 26–34)
MCHC RBC AUTO-ENTMCNC: 33.7 G/DL (ref 32–36)
MCV RBC AUTO: 95 FL (ref 80–100)
MONOCYTES # BLD AUTO: 0.6 X10*3/UL (ref 0.1–1)
MONOCYTES NFR BLD AUTO: 7.8 %
NEUTROPHILS # BLD AUTO: 4.28 X10*3/UL (ref 1.2–7.7)
NEUTROPHILS NFR BLD AUTO: 55.8 %
NON HDL CHOLESTEROL: 173 MG/DL (ref 0–149)
NRBC BLD-RTO: 0 /100 WBCS (ref 0–0)
PLATELET # BLD AUTO: 291 X10*3/UL (ref 150–450)
POTASSIUM SERPL-SCNC: 4.2 MMOL/L (ref 3.5–5.3)
PROT SERPL-MCNC: 7.2 G/DL (ref 6.4–8.2)
RBC # BLD AUTO: 4.96 X10*6/UL (ref 4–5.2)
SODIUM SERPL-SCNC: 139 MMOL/L (ref 136–145)
TRIGL SERPL-MCNC: 266 MG/DL (ref 0–149)
TSH SERPL-ACNC: 3 MIU/L (ref 0.44–3.98)
VLDL: 53 MG/DL (ref 0–40)
WBC # BLD AUTO: 7.7 X10*3/UL (ref 4.4–11.3)

## 2024-06-21 PROCEDURE — 80061 LIPID PANEL: CPT

## 2024-06-21 PROCEDURE — 84443 ASSAY THYROID STIM HORMONE: CPT

## 2024-06-21 PROCEDURE — 36415 COLL VENOUS BLD VENIPUNCTURE: CPT

## 2024-06-21 PROCEDURE — 99214 OFFICE O/P EST MOD 30 MIN: CPT | Performed by: FAMILY MEDICINE

## 2024-06-21 PROCEDURE — 80053 COMPREHEN METABOLIC PANEL: CPT

## 2024-06-21 PROCEDURE — 85025 COMPLETE CBC W/AUTO DIFF WBC: CPT

## 2024-06-21 PROCEDURE — 4004F PT TOBACCO SCREEN RCVD TLK: CPT | Performed by: FAMILY MEDICINE

## 2024-06-21 PROCEDURE — 3079F DIAST BP 80-89 MM HG: CPT | Performed by: FAMILY MEDICINE

## 2024-06-21 PROCEDURE — 3075F SYST BP GE 130 - 139MM HG: CPT | Performed by: FAMILY MEDICINE

## 2024-06-21 PROCEDURE — 83036 HEMOGLOBIN GLYCOSYLATED A1C: CPT

## 2024-06-21 ASSESSMENT — ENCOUNTER SYMPTOMS
LIGHT-HEADEDNESS: 0
DYSURIA: 0
NERVOUS/ANXIOUS: 0
SINUS PRESSURE: 0
NAUSEA: 0
DIZZINESS: 0
EYE PAIN: 0
ABDOMINAL PAIN: 0
FATIGUE: 0
APPETITE CHANGE: 0
HEADACHES: 0
VOMITING: 0
SLEEP DISTURBANCE: 0
ACTIVITY CHANGE: 0
COUGH: 0
WOUND: 0
DYSPHORIC MOOD: 0
CONSTIPATION: 0
ARTHRALGIAS: 0
SHORTNESS OF BREATH: 0
JOINT SWELLING: 0
BLOOD IN STOOL: 0
PALPITATIONS: 0
DIARRHEA: 0

## 2024-06-21 ASSESSMENT — PATIENT HEALTH QUESTIONNAIRE - PHQ9
2. FEELING DOWN, DEPRESSED OR HOPELESS: NOT AT ALL
SUM OF ALL RESPONSES TO PHQ9 QUESTIONS 1 AND 2: 0
1. LITTLE INTEREST OR PLEASURE IN DOING THINGS: NOT AT ALL

## 2024-06-21 NOTE — PROGRESS NOTES
Subjective   Patient ID: Maame Yen is a 34 y.o. female who presents for Med Refill.    HPI   ADD  Taking Adderall twice a day as directed. She denies any side effects  last taken earlier today  She feels like her concentration is much better and she is much more productive  She does not take every day if she is not working or needing to get things accomplished on weekends  Sleep is good  Appetite is good  Mood is good       Restless leg -significantly improved on ropinirole. No concerns     Hypertension-taking chlorthalidone +losartan 25 mg as directed without any side effects.   Swelling has been good  Prviously on hydrochlorothiazide and metoprolol in past  Denies any chest pain, , dizziness, visual changes, headaches  still smoking down to 3-4 packs a week but no ready to quit fully   No recent home bp monitoring      She is very frustrated with difficult weight loss  She states she has made significant changes in her diet and exercise and eats very healthy at this point and still continues to gain weight  She walks most days  Wegovy, zepbound were not covered by her insurance  Surgery not covered either     Eyes still watering   Last seen [facility] 2022- told possible early glaucoma   Has tried many different eyedrops but not sure what to do  No eye pain  Oblique vision changes    Review of Systems   Constitutional:  Negative for activity change, appetite change and fatigue.   HENT:  Negative for congestion, postnasal drip and sinus pressure.    Eyes:  Negative for pain and visual disturbance.   Respiratory:  Negative for cough and shortness of breath.    Cardiovascular:  Negative for chest pain, palpitations and leg swelling.   Gastrointestinal:  Negative for abdominal pain, blood in stool, constipation, diarrhea, nausea and vomiting.   Endocrine: Negative for cold intolerance and heat intolerance.   Genitourinary:  Negative for dysuria and menstrual problem.   Musculoskeletal:  Negative for arthralgias and  joint swelling.   Skin:  Negative for rash and wound.   Neurological:  Negative for dizziness, light-headedness and headaches.   Psychiatric/Behavioral:  Negative for dysphoric mood and sleep disturbance. The patient is not nervous/anxious.        Objective   /80   Temp 36.4 °C (97.6 °F)   Wt 121 kg (266 lb)   BMI 44.95 kg/m²     Physical Exam  Vitals and nursing note reviewed.   Constitutional:       Appearance: Normal appearance. She is normal weight.   HENT:      Head: Normocephalic and atraumatic.      Right Ear: External ear normal.      Left Ear: External ear normal.      Mouth/Throat:      Mouth: Mucous membranes are moist.   Eyes:      General: Lids are normal.      Extraocular Movements: Extraocular movements intact.      Conjunctiva/sclera: Conjunctivae normal.      Pupils: Pupils are equal, round, and reactive to light.      Comments: Tearing bilaterally   Cardiovascular:      Rate and Rhythm: Normal rate and regular rhythm.      Heart sounds: Normal heart sounds.   Pulmonary:      Effort: Pulmonary effort is normal.      Breath sounds: Normal breath sounds.   Musculoskeletal:         General: No swelling.      Cervical back: Normal range of motion and neck supple.   Skin:     General: Skin is warm and dry.      Capillary Refill: Capillary refill takes less than 2 seconds.   Neurological:      General: No focal deficit present.      Mental Status: She is alert. Mental status is at baseline.   Psychiatric:         Mood and Affect: Mood normal.         Behavior: Behavior normal.         Thought Content: Thought content normal.         Judgment: Judgment normal.         Assessment/Plan   1. Benign essential hypertension  Controlled. Continue current medicines/regimen.   Labs due     2. Attention deficit disorder (ADD) without hyperactivity  **I have personally reviewed the OARRS report for the patient. This report is scanned into the electronic medical record. I have considered the risks of abuse,  dependence, addiction and diversion. I believe that it is clinically appropriate for the patient to be prescribed this medication.  Contract and uds utd  Fu 6 months    3. BMI 45.0-49.9, adult (Multi) 4. Morbid obesity (Multi)  Discussed with patient checking to see if insurance covers nonsurgical weight management clinics as I think she needs significant assistance  GLP-1's not covered by her insurance we have tried prior authorizations for both  zepbound and Wegovy  - Referral to Bariatric Surgery; Future        5. Bilateral epiphora  Discussed with patient given her concern of glaucoma at last eye appointment and continued ocular symptoms she needs to get back into see ophthalmology as it has been about 2 years of symptoms      Smoking cessation was discussed  Encouraged patient to get set up with gynecology as she is overdue for Pap smear.  We did discuss that her tobacco use makes her more high risk for cervical cancer  Patient verbalized understanding of plan of care and all questions were answered.

## 2024-06-21 NOTE — PATIENT INSTRUCTIONS
[facility] [address], [address]  [place], [place] [address]  Phone: [phone]  Fax: [phone]    Or     Dr Mary Beth Orellana   [address] [place] [address]  [phone]

## 2024-07-02 ENCOUNTER — TELEPHONE (OUTPATIENT)
Dept: PRIMARY CARE | Facility: CLINIC | Age: 34
End: 2024-07-02
Payer: COMMERCIAL

## 2024-07-02 NOTE — TELEPHONE ENCOUNTER
----- Message from Sage Barrera DO sent at 7/1/2024 10:48 AM EDT -----  Please call the patient regarding her abnormal result.  Let patient know labs overall stable.  Cholesterol slightly improved from last year.  Sugar underneath the prediabetic range so that is good  A couple of her liver enzymes are mildly elevated.  This is likely due to fatty liver we have done an ultrasound of her liver in 2021 that did show that.  Healthy diet and exercise suggested

## 2024-08-12 DIAGNOSIS — F98.8 ATTENTION DEFICIT DISORDER, UNSPECIFIED HYPERACTIVITY PRESENCE: ICD-10-CM

## 2024-08-12 DIAGNOSIS — K21.9 CHRONIC GERD: ICD-10-CM

## 2024-08-12 RX ORDER — DEXTROAMPHETAMINE SACCHARATE, AMPHETAMINE ASPARTATE MONOHYDRATE, DEXTROAMPHETAMINE SULFATE AND AMPHETAMINE SULFATE 3.75; 3.75; 3.75; 3.75 MG/1; MG/1; MG/1; MG/1
15 CAPSULE, EXTENDED RELEASE ORAL 2 TIMES DAILY
Qty: 60 CAPSULE | Refills: 0 | Status: SHIPPED | OUTPATIENT
Start: 2024-10-16 | End: 2024-11-15

## 2024-08-12 RX ORDER — DEXTROAMPHETAMINE SACCHARATE, AMPHETAMINE ASPARTATE MONOHYDRATE, DEXTROAMPHETAMINE SULFATE AND AMPHETAMINE SULFATE 3.75; 3.75; 3.75; 3.75 MG/1; MG/1; MG/1; MG/1
15 CAPSULE, EXTENDED RELEASE ORAL 2 TIMES DAILY
Qty: 60 CAPSULE | Refills: 0 | Status: SHIPPED | OUTPATIENT
Start: 2024-08-17 | End: 2024-09-16

## 2024-08-12 RX ORDER — DEXTROAMPHETAMINE SACCHARATE, AMPHETAMINE ASPARTATE MONOHYDRATE, DEXTROAMPHETAMINE SULFATE AND AMPHETAMINE SULFATE 3.75; 3.75; 3.75; 3.75 MG/1; MG/1; MG/1; MG/1
15 CAPSULE, EXTENDED RELEASE ORAL 2 TIMES DAILY
Qty: 60 CAPSULE | Refills: 0 | Status: SHIPPED | OUTPATIENT
Start: 2024-09-16 | End: 2024-10-16

## 2024-08-12 RX ORDER — PANTOPRAZOLE SODIUM 40 MG/1
40 TABLET, DELAYED RELEASE ORAL
Qty: 90 TABLET | Refills: 1 | Status: SHIPPED | OUTPATIENT
Start: 2024-08-12

## 2024-08-12 NOTE — TELEPHONE ENCOUNTER
Patient's current pharmacy is closing. She needs a refill on pantoprazole and adderall. She is now using [facility]. Pharmacy is updated in chart

## 2024-08-23 DIAGNOSIS — F98.8 ATTENTION DEFICIT DISORDER, UNSPECIFIED HYPERACTIVITY PRESENCE: ICD-10-CM

## 2024-08-23 RX ORDER — DEXTROAMPHETAMINE SACCHARATE, AMPHETAMINE ASPARTATE MONOHYDRATE, DEXTROAMPHETAMINE SULFATE AND AMPHETAMINE SULFATE 3.75; 3.75; 3.75; 3.75 MG/1; MG/1; MG/1; MG/1
15 CAPSULE, EXTENDED RELEASE ORAL 2 TIMES DAILY
Qty: 60 CAPSULE | Refills: 0 | Status: SHIPPED | OUTPATIENT
Start: 2024-08-23 | End: 2024-09-22

## 2024-09-16 ENCOUNTER — E-VISIT (OUTPATIENT)
Dept: PRIMARY CARE | Facility: CLINIC | Age: 34
End: 2024-09-16
Payer: COMMERCIAL

## 2024-09-16 DIAGNOSIS — J06.9 VIRAL URI: Primary | ICD-10-CM

## 2024-09-16 ASSESSMENT — LIFESTYLE VARIABLES: HISTORY_OF_SMOKING: I AM A CURRENT SMOKER

## 2024-09-23 DIAGNOSIS — F98.8 ATTENTION DEFICIT DISORDER, UNSPECIFIED HYPERACTIVITY PRESENCE: ICD-10-CM

## 2024-09-23 RX ORDER — DEXTROAMPHETAMINE SACCHARATE, AMPHETAMINE ASPARTATE MONOHYDRATE, DEXTROAMPHETAMINE SULFATE AND AMPHETAMINE SULFATE 3.75; 3.75; 3.75; 3.75 MG/1; MG/1; MG/1; MG/1
CAPSULE, EXTENDED RELEASE ORAL
Qty: 60 CAPSULE | Refills: 0 | Status: SHIPPED | OUTPATIENT
Start: 2024-09-23

## 2024-10-23 DIAGNOSIS — F98.8 ATTENTION DEFICIT DISORDER, UNSPECIFIED TYPE: ICD-10-CM

## 2024-10-23 DIAGNOSIS — F98.8 ATTENTION DEFICIT DISORDER, UNSPECIFIED TYPE: Primary | ICD-10-CM

## 2024-10-23 RX ORDER — DEXTROAMPHETAMINE SACCHARATE, AMPHETAMINE ASPARTATE MONOHYDRATE, DEXTROAMPHETAMINE SULFATE AND AMPHETAMINE SULFATE 3.75; 3.75; 3.75; 3.75 MG/1; MG/1; MG/1; MG/1
15 CAPSULE, EXTENDED RELEASE ORAL 2 TIMES DAILY
Qty: 60 CAPSULE | Refills: 0 | Status: SHIPPED | OUTPATIENT
Start: 2024-11-23

## 2024-10-23 RX ORDER — DEXTROAMPHETAMINE SACCHARATE, AMPHETAMINE ASPARTATE MONOHYDRATE, DEXTROAMPHETAMINE SULFATE AND AMPHETAMINE SULFATE 3.75; 3.75; 3.75; 3.75 MG/1; MG/1; MG/1; MG/1
15 CAPSULE, EXTENDED RELEASE ORAL 2 TIMES DAILY
Qty: 60 CAPSULE | Refills: 0 | Status: SHIPPED | OUTPATIENT
Start: 2024-10-23 | End: 2024-11-22

## 2024-10-23 RX ORDER — DEXTROAMPHETAMINE SACCHARATE, AMPHETAMINE ASPARTATE MONOHYDRATE, DEXTROAMPHETAMINE SULFATE AND AMPHETAMINE SULFATE 3.75; 3.75; 3.75; 3.75 MG/1; MG/1; MG/1; MG/1
15 CAPSULE, EXTENDED RELEASE ORAL 2 TIMES DAILY
Qty: 60 CAPSULE | Refills: 0 | Status: SHIPPED | OUTPATIENT
Start: 2024-10-23 | End: 2024-10-23 | Stop reason: SDUPTHER

## 2024-10-23 RX ORDER — DEXTROAMPHETAMINE SACCHARATE, AMPHETAMINE ASPARTATE MONOHYDRATE, DEXTROAMPHETAMINE SULFATE AND AMPHETAMINE SULFATE 3.75; 3.75; 3.75; 3.75 MG/1; MG/1; MG/1; MG/1
15 CAPSULE, EXTENDED RELEASE ORAL 2 TIMES DAILY
Qty: 60 CAPSULE | Refills: 0 | Status: SHIPPED | OUTPATIENT
Start: 2024-11-23 | End: 2024-10-23 | Stop reason: SDUPTHER

## 2024-12-13 ENCOUNTER — LAB (OUTPATIENT)
Dept: LAB | Facility: LAB | Age: 34
End: 2024-12-13
Payer: COMMERCIAL

## 2024-12-13 ENCOUNTER — APPOINTMENT (OUTPATIENT)
Dept: PRIMARY CARE | Facility: CLINIC | Age: 34
End: 2024-12-13
Payer: COMMERCIAL

## 2024-12-13 VITALS
WEIGHT: 264.6 LBS | BODY MASS INDEX: 44.72 KG/M2 | DIASTOLIC BLOOD PRESSURE: 84 MMHG | TEMPERATURE: 97.8 F | SYSTOLIC BLOOD PRESSURE: 120 MMHG

## 2024-12-13 DIAGNOSIS — S46.012A STRAIN OF TENDON OF LEFT ROTATOR CUFF, INITIAL ENCOUNTER: ICD-10-CM

## 2024-12-13 DIAGNOSIS — E66.01 MORBID OBESITY (MULTI): ICD-10-CM

## 2024-12-13 DIAGNOSIS — Z51.81 THERAPEUTIC DRUG MONITORING: ICD-10-CM

## 2024-12-13 DIAGNOSIS — R73.9 HYPERGLYCEMIA: ICD-10-CM

## 2024-12-13 DIAGNOSIS — K76.0 FATTY LIVER: ICD-10-CM

## 2024-12-13 DIAGNOSIS — I10 BENIGN ESSENTIAL HYPERTENSION: ICD-10-CM

## 2024-12-13 DIAGNOSIS — F98.8 ATTENTION DEFICIT DISORDER (ADD) WITHOUT HYPERACTIVITY: ICD-10-CM

## 2024-12-13 DIAGNOSIS — R73.9 HYPERGLYCEMIA: Primary | ICD-10-CM

## 2024-12-13 LAB
ALBUMIN SERPL BCP-MCNC: 4.7 G/DL (ref 3.4–5)
ALP SERPL-CCNC: 67 U/L (ref 33–110)
ALT SERPL W P-5'-P-CCNC: 100 U/L (ref 7–45)
ANION GAP SERPL CALC-SCNC: 14 MMOL/L (ref 10–20)
AST SERPL W P-5'-P-CCNC: 68 U/L (ref 9–39)
BASOPHILS # BLD AUTO: 0.07 X10*3/UL (ref 0–0.1)
BASOPHILS NFR BLD AUTO: 1 %
BILIRUB SERPL-MCNC: 0.6 MG/DL (ref 0–1.2)
BUN SERPL-MCNC: 11 MG/DL (ref 6–23)
CALCIUM SERPL-MCNC: 9.5 MG/DL (ref 8.6–10.3)
CHLORIDE SERPL-SCNC: 100 MMOL/L (ref 98–107)
CHOLEST SERPL-MCNC: 202 MG/DL (ref 0–199)
CHOLESTEROL/HDL RATIO: 4
CO2 SERPL-SCNC: 29 MMOL/L (ref 21–32)
CREAT SERPL-MCNC: 0.64 MG/DL (ref 0.5–1.05)
EGFRCR SERPLBLD CKD-EPI 2021: >90 ML/MIN/1.73M*2
EOSINOPHIL # BLD AUTO: 0.21 X10*3/UL (ref 0–0.7)
EOSINOPHIL NFR BLD AUTO: 3 %
ERYTHROCYTE [DISTWIDTH] IN BLOOD BY AUTOMATED COUNT: 12.9 % (ref 11.5–14.5)
EST. AVERAGE GLUCOSE BLD GHB EST-MCNC: 100 MG/DL
GLUCOSE SERPL-MCNC: 80 MG/DL (ref 74–99)
HBA1C MFR BLD: 5.1 %
HCT VFR BLD AUTO: 48.5 % (ref 36–46)
HDLC SERPL-MCNC: 50 MG/DL
HGB BLD-MCNC: 16 G/DL (ref 12–16)
IMM GRANULOCYTES # BLD AUTO: 0.02 X10*3/UL (ref 0–0.7)
IMM GRANULOCYTES NFR BLD AUTO: 0.3 % (ref 0–0.9)
LDLC SERPL CALC-MCNC: 114 MG/DL
LYMPHOCYTES # BLD AUTO: 2.67 X10*3/UL (ref 1.2–4.8)
LYMPHOCYTES NFR BLD AUTO: 38.6 %
MCH RBC QN AUTO: 31.7 PG (ref 26–34)
MCHC RBC AUTO-ENTMCNC: 33 G/DL (ref 32–36)
MCV RBC AUTO: 96 FL (ref 80–100)
MONOCYTES # BLD AUTO: 0.55 X10*3/UL (ref 0.1–1)
MONOCYTES NFR BLD AUTO: 8 %
NEUTROPHILS # BLD AUTO: 3.39 X10*3/UL (ref 1.2–7.7)
NEUTROPHILS NFR BLD AUTO: 49.1 %
NON HDL CHOLESTEROL: 152 MG/DL (ref 0–149)
NRBC BLD-RTO: 0 /100 WBCS (ref 0–0)
PLATELET # BLD AUTO: 282 X10*3/UL (ref 150–450)
POTASSIUM SERPL-SCNC: 4.1 MMOL/L (ref 3.5–5.3)
PROT SERPL-MCNC: 7 G/DL (ref 6.4–8.2)
RBC # BLD AUTO: 5.04 X10*6/UL (ref 4–5.2)
SODIUM SERPL-SCNC: 139 MMOL/L (ref 136–145)
TRIGL SERPL-MCNC: 191 MG/DL (ref 0–149)
VLDL: 38 MG/DL (ref 0–40)
WBC # BLD AUTO: 6.9 X10*3/UL (ref 4.4–11.3)

## 2024-12-13 PROCEDURE — 83036 HEMOGLOBIN GLYCOSYLATED A1C: CPT

## 2024-12-13 PROCEDURE — 4004F PT TOBACCO SCREEN RCVD TLK: CPT | Performed by: FAMILY MEDICINE

## 2024-12-13 PROCEDURE — 36415 COLL VENOUS BLD VENIPUNCTURE: CPT

## 2024-12-13 PROCEDURE — 80053 COMPREHEN METABOLIC PANEL: CPT

## 2024-12-13 PROCEDURE — 3074F SYST BP LT 130 MM HG: CPT | Performed by: FAMILY MEDICINE

## 2024-12-13 PROCEDURE — 80061 LIPID PANEL: CPT

## 2024-12-13 PROCEDURE — 99214 OFFICE O/P EST MOD 30 MIN: CPT | Performed by: FAMILY MEDICINE

## 2024-12-13 PROCEDURE — 85025 COMPLETE CBC W/AUTO DIFF WBC: CPT

## 2024-12-13 PROCEDURE — 3079F DIAST BP 80-89 MM HG: CPT | Performed by: FAMILY MEDICINE

## 2024-12-13 ASSESSMENT — ENCOUNTER SYMPTOMS
SLEEP DISTURBANCE: 0
ABDOMINAL PAIN: 0
NERVOUS/ANXIOUS: 0
PALPITATIONS: 0
NAUSEA: 0
ARTHRALGIAS: 0
EYE PAIN: 0
SINUS PRESSURE: 0
JOINT SWELLING: 0
COUGH: 0
HEADACHES: 0
VOMITING: 0
BLOOD IN STOOL: 0
WOUND: 0
DIARRHEA: 0
DIZZINESS: 0
DYSPHORIC MOOD: 0
LIGHT-HEADEDNESS: 0
SHORTNESS OF BREATH: 0
ACTIVITY CHANGE: 0
DYSURIA: 0
CONSTIPATION: 0
FATIGUE: 0
APPETITE CHANGE: 0
MYALGIAS: 1

## 2024-12-13 NOTE — PROGRESS NOTES
Subjective   Patient ID: Maame Yen is a 34 y.o. female who presents for Med Refill and Arm Pain (Left upper arm pain).    HPI     Left upper arm pain  6/10- constant, sore ache   Has been present for a week or so  Worse with extension of arm over 90   Recently increased her workout and thought related to that   Has tried heating pad and nsaids a few times   Unsure if helped much     ADD  Taking Adderall twice a day as directed. She denies any side effects  last taken earlier today  She feels like her concentration is much better and she is much more productive  She does not take every day if she is not working or needing to get things accomplished on weekends  Sleep is good  Appetite is good  Mood is good       Restless leg -significantly improved on ropinirole. No concerns     Hypertension-taking chlorthalidone +losartan 25 mg as directed without any side effects.   Swelling has been good  Prviously on hydrochlorothiazide and metoprolol in past  Denies any chest pain, , dizziness, visual changes, headaches  still smoking down to 3-4 packs a week but no ready to quit fully   No recent home bp monitoring      She is very frustrated with difficult weight loss  She states she has made significant changes in her diet and exercise and eats very healthy at this point and still continues to gain weight  She walks most days  Wegovy, zepbound were not covered by her insurance  Surgery not covered either     Fatty liver  Has been doing otc liver detox for fatty liver and would like her numbers rechecked    Review of Systems   Constitutional:  Negative for activity change, appetite change and fatigue.   HENT:  Negative for congestion, postnasal drip and sinus pressure.    Eyes:  Negative for pain and visual disturbance.   Respiratory:  Negative for cough and shortness of breath.    Cardiovascular:  Negative for chest pain, palpitations and leg swelling.   Gastrointestinal:  Negative for abdominal pain, blood in stool,  constipation, diarrhea, nausea and vomiting.   Endocrine: Negative for cold intolerance and heat intolerance.   Genitourinary:  Negative for dysuria and menstrual problem.   Musculoskeletal:  Positive for myalgias (Left upper extremity). Negative for arthralgias and joint swelling.   Skin:  Negative for rash and wound.   Neurological:  Negative for dizziness, light-headedness and headaches.   Psychiatric/Behavioral:  Negative for dysphoric mood and sleep disturbance. The patient is not nervous/anxious.        Objective   /84   Temp 36.6 °C (97.8 °F)   Wt 120 kg (264 lb 9.6 oz)   BMI 44.72 kg/m²     Physical Exam  Vitals and nursing note reviewed.   Constitutional:       Appearance: Normal appearance. She is normal weight.   HENT:      Head: Normocephalic and atraumatic.      Right Ear: External ear normal.      Left Ear: External ear normal.      Mouth/Throat:      Mouth: Mucous membranes are moist.   Eyes:      General: Lids are normal.      Extraocular Movements: Extraocular movements intact.      Conjunctiva/sclera: Conjunctivae normal.      Pupils: Pupils are equal, round, and reactive to light.      Comments: Tearing bilaterally   Cardiovascular:      Rate and Rhythm: Normal rate and regular rhythm.      Heart sounds: Normal heart sounds.   Pulmonary:      Effort: Pulmonary effort is normal.      Breath sounds: Normal breath sounds.   Musculoskeletal:         General: No swelling.      Right shoulder: Normal.      Left shoulder: No swelling, deformity, laceration, tenderness, bony tenderness or crepitus. Decreased range of motion (Limited in full extension secondary to pain but otherwise can move fully). Normal strength. Normal pulse.      Cervical back: Normal range of motion and neck supple.   Skin:     General: Skin is warm and dry.      Capillary Refill: Capillary refill takes less than 2 seconds.   Neurological:      General: No focal deficit present.      Mental Status: She is alert. Mental  status is at baseline.   Psychiatric:         Mood and Affect: Mood normal.         Behavior: Behavior normal.         Thought Content: Thought content normal.         Judgment: Judgment normal.         Assessment/Plan   1. Benign essential hypertension  Controlled. Continue current medicines/regimen.   Labs due     2. Attention deficit disorder (ADD) without hyperactivity  **I have personally reviewed the OARRS report for the patient. This report is scanned into the electronic medical record. I have considered the risks of abuse, dependence, addiction and diversion. I believe that it is clinically appropriate for the patient to be prescribed this medication.  Urine done today.  Contract reviewed  Fu 6 months    3. BMI 45.0-49.9, adult (Multi) 4. Morbid obesity (Multi)  Referral to pharmacy as patient would really like to be on GLP-1 but trouble with insurance to look into options        5.  Left upper arm pain-discussed with patient likely strain of one of her rotator cuff muscles.  We discussed supportive care and offered to orthopedics if her symptoms worsen      Smoking cessation was discussed  Previously encouraged patient to get set up with gynecology as she is overdue for Pap smear.  We did discuss that her tobacco use makes her more high risk for cervical cancer  Patient verbalized understanding of plan of care and all questions were answered.

## 2024-12-14 LAB
AMPHETAMINES UR QL SCN: ABNORMAL
BARBITURATES UR QL SCN: ABNORMAL
BENZODIAZ UR QL SCN: ABNORMAL
BZE UR QL SCN: ABNORMAL
CANNABINOIDS UR QL SCN: ABNORMAL
FENTANYL+NORFENTANYL UR QL SCN: ABNORMAL
METHADONE UR QL SCN: ABNORMAL
OPIATES UR QL SCN: ABNORMAL
OXYCODONE+OXYMORPHONE UR QL SCN: ABNORMAL
PCP UR QL SCN: ABNORMAL

## 2024-12-16 DIAGNOSIS — K76.0 FATTY LIVER: Primary | ICD-10-CM

## 2024-12-17 ENCOUNTER — TELEPHONE (OUTPATIENT)
Dept: PRIMARY CARE | Facility: CLINIC | Age: 34
End: 2024-12-17
Payer: COMMERCIAL

## 2024-12-17 NOTE — TELEPHONE ENCOUNTER
----- Message from Sage Barrera sent at 12/16/2024  8:59 AM EST -----  Let patient know labs show her sugar is good  Her cholesterol is actually down quite a bit as well  Her liver enzymes are slightly elevated which is likely related to overall metabolic syndrome plus known fatty liver seen on ultrasound 2023.  Because they have jumped so considerably and would like to recheck them again in a month or 2.  Be careful with Tylenol and avoid alcohol

## 2024-12-18 LAB
AMPHET UR-MCNC: 3882 NG/ML
MDA UR-MCNC: <200 NG/ML
MDEA UR-MCNC: <200 NG/ML
MDMA UR-MCNC: <200 NG/ML
METHAMPHET UR-MCNC: <200 NG/ML
PHENTERMINE UR CFM-MCNC: <200 NG/ML

## 2024-12-19 ENCOUNTER — TELEMEDICINE (OUTPATIENT)
Dept: PHARMACY | Facility: HOSPITAL | Age: 34
End: 2024-12-19
Payer: COMMERCIAL

## 2024-12-19 DIAGNOSIS — I10 BENIGN ESSENTIAL HYPERTENSION: ICD-10-CM

## 2024-12-19 DIAGNOSIS — R73.9 HYPERGLYCEMIA: ICD-10-CM

## 2024-12-19 DIAGNOSIS — E66.01 MORBID OBESITY (MULTI): ICD-10-CM

## 2024-12-19 PROCEDURE — RXMED WILLOW AMBULATORY MEDICATION CHARGE

## 2024-12-19 NOTE — PROGRESS NOTES
Clinical Pharmacy Appointment    Patient ID: Maame Yen is a 34 y.o. female who presents for Weight Management.    Pt is here for First appointment.     Referring Provider: Sage Barrera DO  PCP: Sage Barrera DO   Last visit with PCP: 12/13/2024   Next visit with PCP: 6/13/2025      Subjective     HPI  WEIGHT MANAGEMENT  PMH significant for hypertension, fatty liver  Special needs/barriers to therapy: cost/coverage of GLP1s    BMI Readings from Last 1 Encounters:   12/13/24 44.72 kg/m²     Lab Results   Component Value Date    HGBA1C 5.1 12/13/2024    GLUCOSE 80 12/13/2024     Lifestyle  Diet: 2 meals/day  Breakfast: usually skips, protein shakes  Lunch: carrots, snacks  Dinner: small dinner  Snacks: occasional Fritos  Drinks: Red Bull - has cut back  Has worked with nutritionist/dietician? No  Physical Activity: treadmill after dinner for 30-40 minutes three times weekly, walking dogs around the block  Alcohol Use: none     Non-Pharmacological Therapy  Weight loss techniques attempted:  Self-directed dieting: keto, reduced calorie    Pharmacological Therapy  Current Medications: none for weight loss  Previous Medications: none for weight loss     Weight Loss  Starting weight: 264 pounds at office visit 12/13/2024    Pertinent PMH Review  PMH of Pancreatitis: No  PMH of Retinopathy: No  PMH of MTC: No     Preventative Care  ASCVD Risk:   The ASCVD Risk score (Corbin MOLINA, et al., 2019) failed to calculate for the following reasons:    The 2019 ASCVD risk score is only valid for ages 40 to 79  On Statin?: No      Medication System Management  Patient's preferred pharmacy: [facility] in [place], [place] or [facility] for delivery  Adherence/Organization: no concerns identified  Affordability/Accessibility: cost/coverage of GLP1s  Insurance coverage of weight-loss medications? No  Plan/benefit exclusion for Wegovy and Zepbound  Eligible for  Patient Assistance Program? Yes, will apply  Eligible for copay  "cards/programs? Yes, will enroll      Objective   Allergies   Allergen Reactions    Penicillins Hives     Social History     Social History Narrative    Not on file      Medication Review  Current Outpatient Medications   Medication Instructions    amphetamine-dextroamphetamine XR (Adderall XR) 15 mg 24 hr capsule 15 mg, oral, 2 times daily, Do not crush or chew.    amphetamine-dextroamphetamine XR (Adderall XR) 15 mg 24 hr capsule 15 mg, oral, 2 times daily, Do not crush or chew.    amphetamine-dextroamphetamine XR (Adderall XR) 15 mg 24 hr capsule 15 mg, oral, 2 times daily, Do not crush or chew.    chlorthalidone (HYGROTON) 50 mg, oral, Daily    losartan (COZAAR) 25 mg, oral, Daily    pantoprazole (PROTONIX) 40 mg, oral, Daily before breakfast, Do not crush, chew, or split.    rOPINIRole (Requip) 1 mg tablet 1 tablet, Nightly    tirzepatide (weight loss) (ZEPBOUND) 2.5 mg, subcutaneous, Every 7 days      Vitals  BP Readings from Last 2 Encounters:   12/13/24 120/84   06/21/24 130/80     BMI Readings from Last 1 Encounters:   12/13/24 44.72 kg/m²      Labs  A1C  Lab Results   Component Value Date    HGBA1C 5.1 12/13/2024    HGBA1C 5.5 06/21/2024    HGBA1C 5.6 02/06/2023     BMP  Lab Results   Component Value Date    CALCIUM 9.5 12/13/2024     12/13/2024    K 4.1 12/13/2024    CO2 29 12/13/2024     12/13/2024    BUN 11 12/13/2024    CREATININE 0.64 12/13/2024    EGFR >90 12/13/2024     LFTs  Lab Results   Component Value Date     (H) 12/13/2024    AST 68 (H) 12/13/2024    ALKPHOS 67 12/13/2024    BILITOT 0.6 12/13/2024     FLP  Lab Results   Component Value Date    TRIG 191 (H) 12/13/2024    CHOL 202 (H) 12/13/2024    LDLF 164 (H) 02/26/2021    LDLCALC 114 (H) 12/13/2024    HDL 50.0 12/13/2024     Urine Microalbumin  No results found for: \"MICROALBCREA\"  Weight Management  Wt Readings from Last 3 Encounters:   12/13/24 120 kg (264 lb 9.6 oz)   06/21/24 121 kg (266 lb)   12/18/23 121 kg (266 lb) "      There is no height or weight on file to calculate BMI.     Assessment/Plan   Problem List Items Addressed This Visit       Benign essential hypertension    Hyperglycemia     Other Visit Diagnoses       Morbid obesity (Multi)        Relevant Medications    tirzepatide, weight loss, (Zepbound) 2.5 mg/0.5 mL injection    Other Relevant Orders    Referral to Nutrition Services            DISCUSSION/PLAN:  Patient with hypertension and fatty liver interested in starting GLP1 for weight loss, blood glucose regulation, and cardio/renal benefits. Her insurance does not cover Wegovy or Zepbound, but she may be eligible for coverage via the  Patient Assistance Program in combination with copay card. Patient has submitted financial documents and detailed education provided at this visit. Will proceed with application and follow up with determination.    START  Zepbound 2.5 mg under the skin once weekly    Future Considerations:  Zepbound dose titration    Monitoring and Education:  Counseled on Zepbound mechanism of action, benefits, side effects, monitoring, and potential complications  Detailed education on Zepbound administration to ensure proper technique  Discussed storage, missed doses, and titration schedule  Answered all patient questions      Continue all meds under the continuation of care with the referring provider and clinical pharmacy team.    Clinical Pharmacist follow-up: as needed pending  Patient Assistance Program determination  Orders placed: Zepbound 2.5 mg to [facility] for  Patient Assistance application    Thank you,   Sindy Walker, PharmD  Clinical Pharmacy Specialist, Primary Care   [phone]    Verbal consent to manage patient's drug therapy was obtained from the patient . Patient was informed she may decline to participate or withdraw from participation in pharmacy services at any time.

## 2024-12-20 ENCOUNTER — TELEPHONE (OUTPATIENT)
Dept: PHARMACY | Facility: HOSPITAL | Age: 34
End: 2024-12-20
Payer: COMMERCIAL

## 2024-12-20 DIAGNOSIS — R73.9 HYPERGLYCEMIA: Primary | ICD-10-CM

## 2024-12-20 DIAGNOSIS — I10 BENIGN ESSENTIAL HYPERTENSION: ICD-10-CM

## 2024-12-20 DIAGNOSIS — E66.01 MORBID OBESITY (MULTI): ICD-10-CM

## 2024-12-20 NOTE — TELEPHONE ENCOUNTER
Clinical Pharmacy Phone Call:    Contacted patient to let her know that application for assistance has been approved.     This approval is valid through 12/19/2025 as long as the following criteria continue to be satisfied:     Your medication (Zepbound) remains covered under your current insurance plan/copay card program.   Your prescriber does not discontinue therapy.   You do not seek reimbursement from any other private or government-funded programs for the medication.    Under this program, the pharmacy will first bill your insurance plan for your indemnified specified medication. The [employer] Fund will then offset your copay balance, so that your out-of pocket expense for your specialty medication will be $0.00.    Clinical Pharmacy Follow-Up: January 14th, 2025 at 12:00 PM    Thank you,   Sindy Walker, PharmD  Clinical Pharmacy Specialist, Primary Care   [phone]

## 2024-12-23 ENCOUNTER — PHARMACY VISIT (OUTPATIENT)
Dept: PHARMACY | Facility: CLINIC | Age: 34
End: 2024-12-23
Payer: COMMERCIAL

## 2025-01-02 ENCOUNTER — TELEMEDICINE (OUTPATIENT)
Dept: PRIMARY CARE | Facility: CLINIC | Age: 35
End: 2025-01-02
Payer: COMMERCIAL

## 2025-01-02 DIAGNOSIS — J06.9 PROTRACTED UPPER RESPIRATORY INFECTION: ICD-10-CM

## 2025-01-02 DIAGNOSIS — R09.89 CHEST CONGESTION: ICD-10-CM

## 2025-01-02 DIAGNOSIS — F17.200 SMOKER: ICD-10-CM

## 2025-01-02 DIAGNOSIS — R05.2 SUBACUTE COUGH: Primary | ICD-10-CM

## 2025-01-02 PROCEDURE — 99214 OFFICE O/P EST MOD 30 MIN: CPT | Performed by: NURSE PRACTITIONER

## 2025-01-02 RX ORDER — DOXYCYCLINE 100 MG/1
100 CAPSULE ORAL 2 TIMES DAILY
Qty: 14 CAPSULE | Refills: 0 | Status: SHIPPED | OUTPATIENT
Start: 2025-01-02 | End: 2025-01-09

## 2025-01-02 RX ORDER — ALBUTEROL SULFATE 90 UG/1
INHALANT RESPIRATORY (INHALATION)
Qty: 6.7 G | Refills: 0 | Status: SHIPPED | OUTPATIENT
Start: 2025-01-02

## 2025-01-02 NOTE — PROGRESS NOTES
Subjective   Patient ID: Maame Yen is a 34 y.o. female who presents for URI.  Began 12/23  Worsened quickly, plateaued  No fever  today  Nasal congestion, wet cough some headache  Feels like in her chest  No OTCs used  No rashes or joint pain.  Did COVID, not detected day 4-5     URI   Associated symptoms include coughing (evident).       Review of Systems   Respiratory:  Positive for cough (evident). Negative for shortness of breath.        Objective   Physical Exam  Constitutional:       General: She is not in acute distress.     Appearance: She is not toxic-appearing.   Pulmonary:      Effort: Pulmonary effort is normal.      Comments: No conversational dyspnea  Musculoskeletal:      Cervical back: Neck supple.   Neurological:      Mental Status: She is disoriented.         Assessment/Plan   Diagnoses and all orders for this visit:  Subacute cough  -     doxycycline (Vibramycin) 100 mg capsule; Take 1 capsule (100 mg) by mouth 2 times a day for 7 days. Take with at least 8 ounces (large glass) of water, do not lie down for 30 minutes after  -     albuterol (Proventil HFA) 90 mcg/actuation inhaler; Use 2 puffs every  4-6 hours while awake for next 2 days, then every 4-6 hours needed for cough.  -     dextromethorphan-guaifenesin (Mucinex DM)  mg 12 hr tablet; Take 1 tablet by mouth every 12 hours for 10 days. Do not crush, chew, or split.  Protracted upper respiratory infection  -     doxycycline (Vibramycin) 100 mg capsule; Take 1 capsule (100 mg) by mouth 2 times a day for 7 days. Take with at least 8 ounces (large glass) of water, do not lie down for 30 minutes after  -     dextromethorphan-guaifenesin (Mucinex DM)  mg 12 hr tablet; Take 1 tablet by mouth every 12 hours for 10 days. Do not crush, chew, or split.  Chest congestion  -     doxycycline (Vibramycin) 100 mg capsule; Take 1 capsule (100 mg) by mouth 2 times a day for 7 days. Take with at least 8 ounces (large glass) of water, do not  lie down for 30 minutes after  -     albuterol (Proventil HFA) 90 mcg/actuation inhaler; Use 2 puffs every  4-6 hours while awake for next 2 days, then every 4-6 hours needed for cough.  -     dextromethorphan-guaifenesin (Mucinex DM)  mg 12 hr tablet; Take 1 tablet by mouth every 12 hours for 10 days. Do not crush, chew, or split.  Smoker  -     doxycycline (Vibramycin) 100 mg capsule; Take 1 capsule (100 mg) by mouth 2 times a day for 7 days. Take with at least 8 ounces (large glass) of water, do not lie down for 30 minutes after  -     albuterol (Proventil HFA) 90 mcg/actuation inhaler; Use 2 puffs every  4-6 hours while awake for next 2 days, then every 4-6 hours needed for cough.  -     dextromethorphan-guaifenesin (Mucinex DM)  mg 12 hr tablet; Take 1 tablet by mouth every 12 hours for 10 days. Do not crush, chew, or split.           LIU Culver 01/02/25 9:51 AM

## 2025-01-14 ENCOUNTER — APPOINTMENT (OUTPATIENT)
Dept: PHARMACY | Facility: HOSPITAL | Age: 35
End: 2025-01-14
Payer: COMMERCIAL

## 2025-01-14 DIAGNOSIS — I10 BENIGN ESSENTIAL HYPERTENSION: ICD-10-CM

## 2025-01-14 DIAGNOSIS — E66.01 MORBID OBESITY (MULTI): ICD-10-CM

## 2025-01-14 DIAGNOSIS — R73.9 HYPERGLYCEMIA: ICD-10-CM

## 2025-01-14 PROCEDURE — RXMED WILLOW AMBULATORY MEDICATION CHARGE

## 2025-01-14 NOTE — PROGRESS NOTES
Clinical Pharmacy Appointment    Patient ID: Maame Yen is a 35 y.o. female who presents for Weight Management.    Pt is here for Follow Up appointment.     Referring Provider: Sage Barrera DO  PCP: Sage Barrera DO   Last visit with PCP: 12/13/2024   Next visit with PCP: 6/13/2025     Patient Assistance for Zepbound approved through 12/19/2025. Will have to be renewed prior to that date to prevent lapse in coverage. Medication(s) will be received at no cost to patient from [facility].    INTERVAL HISTORY   Patient's last appointment with clinical pharmacy team on 12/19/2024  Recent hospitalizations? No   Medication changes? Yes  Recent course of doxycyline plus albuterol and Mucinex for upper respiratory infection  Missed doses of medications? No   Side effects? No   Updated relevant labs? No   Changes to diet or exercise regimen? Yes   Patient is eating smaller portions since starting Zepbound  She has been cutting back on soda and Red Bull, increasing protein intake, and drinking more water      Subjective     HPI  WEIGHT MANAGEMENT  PMH significant for hypertension, fatty liver  Special needs/barriers to therapy: cost/coverage of GLP1s    BMI Readings from Last 1 Encounters:   12/13/24 44.72 kg/m²     Lab Results   Component Value Date    HGBA1C 5.1 12/13/2024    GLUCOSE 80 12/13/2024     Lifestyle  Diet: 2 meals/day  Breakfast: usually skips, protein shakes  Lunch: carrots, snacks  Dinner: small dinner  Snacks: occasional Fritos  Drinks: Red Bull - has cut back  Has worked with nutritionist/dietician? No  Physical Activity: treadmill after dinner for 30-40 minutes three times weekly, walking dogs around the block  Alcohol Use: none     Non-Pharmacological Therapy  Weight loss techniques attempted:  Self-directed dieting: keto, reduced calorie    Pharmacological Therapy  Current Medications:   Zepbound 2.5 mg once weekly    Previous Medications: none for weight loss     Weight  Loss  Starting weight: 264 pounds at office visit 12/13/2024  Current weight unchanged as of 1/14/2025 (on Zepbound 2.5 mg)    Pertinent PMH Review  PMH of Pancreatitis: No  PMH of Retinopathy: No  PMH of MTC: No     Preventative Care  ASCVD Risk:   The ASCVD Risk score (Corbin MOLINA, et al., 2019) failed to calculate for the following reasons:    The 2019 ASCVD risk score is only valid for ages 40 to 79  On Statin?: No      Medication System Management  Patient's preferred pharmacy: [facility] in [place], [place] or [facility] for delivery  Adherence/Organization: no concerns identified  Affordability/Accessibility: cost/coverage of GLP1s  Insurance coverage of weight-loss medications? No  Plan/benefit exclusion for Wegovy and Zepbound  Eligible for  Patient Assistance Program? Yes, enrolled  Eligible for copay cards/programs? Yes, enrolled      Objective   Allergies   Allergen Reactions    Penicillins Hives     Social History     Social History Narrative    Not on file      Medication Review  Current Outpatient Medications   Medication Instructions    albuterol (Proventil HFA) 90 mcg/actuation inhaler Use 2 puffs every  4-6 hours while awake for next 2 days, then every 4-6 hours needed for cough.    amphetamine-dextroamphetamine XR (Adderall XR) 15 mg 24 hr capsule 15 mg, oral, 2 times daily, Do not crush or chew.    amphetamine-dextroamphetamine XR (Adderall XR) 15 mg 24 hr capsule 15 mg, oral, 2 times daily, Do not crush or chew.    amphetamine-dextroamphetamine XR (Adderall XR) 15 mg 24 hr capsule 15 mg, oral, 2 times daily, Do not crush or chew.    chlorthalidone (HYGROTON) 50 mg, oral, Daily    losartan (COZAAR) 25 mg, oral, Daily    pantoprazole (PROTONIX) 40 mg, oral, Daily before breakfast, Do not crush, chew, or split.    rOPINIRole (Requip) 1 mg tablet 1 tablet, Nightly    tirzepatide (weight loss) (ZEPBOUND) 5 mg, subcutaneous, Every 7 days      Vitals  BP Readings from Last 2 Encounters:   12/13/24 120/84  "  06/21/24 130/80     BMI Readings from Last 1 Encounters:   12/13/24 44.72 kg/m²      Labs  A1C  Lab Results   Component Value Date    HGBA1C 5.1 12/13/2024    HGBA1C 5.5 06/21/2024    HGBA1C 5.6 02/06/2023     BMP  Lab Results   Component Value Date    CALCIUM 9.5 12/13/2024     12/13/2024    K 4.1 12/13/2024    CO2 29 12/13/2024     12/13/2024    BUN 11 12/13/2024    CREATININE 0.64 12/13/2024    EGFR >90 12/13/2024     LFTs  Lab Results   Component Value Date     (H) 12/13/2024    AST 68 (H) 12/13/2024    ALKPHOS 67 12/13/2024    BILITOT 0.6 12/13/2024     FLP  Lab Results   Component Value Date    TRIG 191 (H) 12/13/2024    CHOL 202 (H) 12/13/2024    LDLF 164 (H) 02/26/2021    LDLCALC 114 (H) 12/13/2024    HDL 50.0 12/13/2024     Urine Microalbumin  No results found for: \"MICROALBCREA\"  Weight Management  Wt Readings from Last 3 Encounters:   12/13/24 120 kg (264 lb 9.6 oz)   06/21/24 121 kg (266 lb)   12/18/23 121 kg (266 lb)      There is no height or weight on file to calculate BMI.     Assessment/Plan   Problem List Items Addressed This Visit       Benign essential hypertension    Relevant Orders    Referral to Clinical Pharmacy    Hyperglycemia    Relevant Medications    tirzepatide, weight loss, (Zepbound) 5 mg/0.5 mL injection    Other Relevant Orders    Referral to Clinical Pharmacy     Other Visit Diagnoses       Morbid obesity (Multi)        Relevant Medications    tirzepatide, weight loss, (Zepbound) 5 mg/0.5 mL injection    Other Relevant Orders    Referral to Clinical Pharmacy            DISCUSSION/PLAN:  Patient started on Zepbound in December 2024 for weight loss and other benefits. She is tolerating well so far with no significant side effects or missed doses. She is eating smaller portions, drinking more water, and increasing protein intake. Patient has not noticed much difference in appetite and weight remains the same. Will increase Zepbound dose to 5 mg and follow up in " one month.    INCREASE  Zepbound to 5 mg under the skin once weekly on Tuesdays    Future Considerations:  Zepbound dose titration    Monitoring and Education:  Counseled on Zepbound mechanism of action, dosing, storage, side effects, and monitoring  Reviewed Zepbound administration and dose titration schedule  Answered all patient questions      Continue all meds under the continuation of care with the referring provider and clinical pharmacy team.    Clinical Pharmacist follow-up: February 11th, 2025 at 12:00 PM  Orders placed: Zepbound 5 mg to [facility] for home delivery    Thank you,   Sindy Walker, PharmD  Clinical Pharmacy Specialist, Primary Care   [phone]    Verbal consent to manage patient's drug therapy was obtained from the patient . Patient was informed she may decline to participate or withdraw from participation in pharmacy services at any time.

## 2025-01-15 PROBLEM — R05.2 SUBACUTE COUGH: Status: ACTIVE | Noted: 2025-01-15

## 2025-01-15 PROBLEM — R09.89 CHEST CONGESTION: Status: ACTIVE | Noted: 2025-01-15

## 2025-01-15 PROBLEM — F17.200 SMOKER: Status: ACTIVE | Noted: 2025-01-15

## 2025-01-15 PROBLEM — J06.9 PROTRACTED UPPER RESPIRATORY INFECTION: Status: ACTIVE | Noted: 2025-01-15

## 2025-01-15 ASSESSMENT — ENCOUNTER SYMPTOMS
SHORTNESS OF BREATH: 0
COUGH: 1

## 2025-01-15 NOTE — ASSESSMENT & PLAN NOTE
Hx and limited exam is c/w viral URI, given smoker and symptoms plateau will treat.  Demo'd correct use of MDI - to use every 4-6 hours for next 2-3 days, then as needed to reduce spasm of airway and promote air movement  Reviewed expected course versus symptoms to report, r/b/a med use  Mucinex DM - lots of water for it to work.  Follow with PCP if new symptoms such as worsening or fever, trouble breathing or chest pain.  Agrees

## 2025-01-16 ENCOUNTER — PHARMACY VISIT (OUTPATIENT)
Dept: PHARMACY | Facility: CLINIC | Age: 35
End: 2025-01-16
Payer: COMMERCIAL

## 2025-01-22 DIAGNOSIS — F98.8 ATTENTION DEFICIT DISORDER, UNSPECIFIED TYPE: ICD-10-CM

## 2025-01-22 RX ORDER — DEXTROAMPHETAMINE SACCHARATE, AMPHETAMINE ASPARTATE MONOHYDRATE, DEXTROAMPHETAMINE SULFATE AND AMPHETAMINE SULFATE 3.75; 3.75; 3.75; 3.75 MG/1; MG/1; MG/1; MG/1
15 CAPSULE, EXTENDED RELEASE ORAL 2 TIMES DAILY
Qty: 60 CAPSULE | Refills: 0 | Status: SHIPPED | OUTPATIENT
Start: 2025-01-22 | End: 2025-02-21

## 2025-02-11 ENCOUNTER — APPOINTMENT (OUTPATIENT)
Dept: PHARMACY | Facility: HOSPITAL | Age: 35
End: 2025-02-11
Payer: COMMERCIAL

## 2025-02-11 DIAGNOSIS — E66.01 MORBID OBESITY (MULTI): ICD-10-CM

## 2025-02-11 DIAGNOSIS — R73.9 HYPERGLYCEMIA: ICD-10-CM

## 2025-02-11 DIAGNOSIS — I10 BENIGN ESSENTIAL HYPERTENSION: ICD-10-CM

## 2025-02-11 PROCEDURE — RXMED WILLOW AMBULATORY MEDICATION CHARGE

## 2025-02-11 NOTE — PROGRESS NOTES
Clinical Pharmacy Appointment    Patient ID: Maame Yen is a 35 y.o. female who presents for Weight Management.    Pt is here for Follow Up appointment.     Referring Provider: Sage Barrera DO  PCP: Sage Barrera DO   Last visit with PCP: 12/13/2024   Next visit with PCP: 6/13/2025     Patient Assistance for Zepbound approved through 12/19/2025. Will have to be renewed prior to that date to prevent lapse in coverage. Medication(s) will be received at no cost to patient from [facility].    INTERVAL HISTORY   Patient's last appointment with clinical pharmacy team on 1/14/2025  Recent hospitalizations? No   Medication changes? No  Missed doses of medications? No   Side effects? Yes  Patient reports occasional constipation - currently manageable with increased fiber  Updated relevant labs? No   Changes to diet or exercise regimen? No  She continues with smaller portion sizes, increased protein intake, more water, and regular exercise      Subjective     HPI  WEIGHT MANAGEMENT  PMH significant for hypertension, fatty liver  Special needs/barriers to therapy: cost/coverage of GLP1s    BMI Readings from Last 1 Encounters:   12/13/24 44.72 kg/m²     Lab Results   Component Value Date    HGBA1C 5.1 12/13/2024    GLUCOSE 80 12/13/2024     Lifestyle  Diet: 2 meals/day  Breakfast: usually skips, protein shakes  Lunch: carrots, snacks  Dinner: small dinner, salad  Snacks: occasional Fritos  Drinks: Red Bull - has cut back  Has worked with nutritionist/dietician? No  Physical Activity: treadmill after dinner for 30-40 minutes three times weekly, walking dogs around the block  Alcohol Use: none     Non-Pharmacological Therapy  Weight loss techniques attempted:  Self-directed dieting: keto, reduced calorie    Pharmacological Therapy  Current Medications:   Zepbound 5 mg once weekly    Previous Medications: none for weight loss     Weight Loss  Starting weight: 264 pounds at office visit 12/13/2024  Previous  weight unchanged as of 1/14/2025 (on Zepbound 2.5 mg)  Current weight 262.2 pounds on home scale 2/11/2025 (on Zepbound 5 mg)    Pertinent PMH Review  PMH of Pancreatitis: No  PMH of Retinopathy: No  PMH of MTC: No     Preventative Care  ASCVD Risk:   The ASCVD Risk score (Corbin MOLINA, et al., 2019) failed to calculate for the following reasons:    The 2019 ASCVD risk score is only valid for ages 40 to 79  On Statin?: No      Medication System Management  Patient's preferred pharmacy: [facility] in [place], [place] or [facility] for delivery  Adherence/Organization: no concerns identified  Affordability/Accessibility: cost/coverage of GLP1s  Insurance coverage of weight-loss medications? No  Plan/benefit exclusion for Wegovy and Zepbound  Eligible for  Patient Assistance Program? Yes, enrolled  Eligible for copay cards/programs? Yes, enrolled      Objective   Allergies   Allergen Reactions    Penicillins Hives     Social History     Social History Narrative    Not on file      Medication Review  Current Outpatient Medications   Medication Instructions    albuterol (Proventil HFA) 90 mcg/actuation inhaler Use 2 puffs every  4-6 hours while awake for next 2 days, then every 4-6 hours needed for cough.    amphetamine-dextroamphetamine XR (Adderall XR) 15 mg 24 hr capsule 15 mg, oral, 2 times daily, Do not crush or chew.    amphetamine-dextroamphetamine XR (Adderall XR) 15 mg 24 hr capsule 15 mg, oral, 2 times daily, Do not crush or chew.    amphetamine-dextroamphetamine XR (Adderall XR) 15 mg 24 hr capsule 15 mg, oral, 2 times daily, Do not crush or chew.    chlorthalidone (HYGROTON) 50 mg, oral, Daily    losartan (COZAAR) 25 mg, oral, Daily    pantoprazole (PROTONIX) 40 mg, oral, Daily before breakfast, Do not crush, chew, or split.    rOPINIRole (Requip) 1 mg tablet 1 tablet, Nightly    tirzepatide (weight loss) (ZEPBOUND) 7.5 mg, subcutaneous, Every 7 days      Vitals  BP Readings from Last 2 Encounters:   12/13/24 120/84  "  06/21/24 130/80     BMI Readings from Last 1 Encounters:   12/13/24 44.72 kg/m²      Labs  A1C  Lab Results   Component Value Date    HGBA1C 5.1 12/13/2024    HGBA1C 5.5 06/21/2024    HGBA1C 5.6 02/06/2023     BMP  Lab Results   Component Value Date    CALCIUM 9.5 12/13/2024     12/13/2024    K 4.1 12/13/2024    CO2 29 12/13/2024     12/13/2024    BUN 11 12/13/2024    CREATININE 0.64 12/13/2024    EGFR >90 12/13/2024     LFTs  Lab Results   Component Value Date     (H) 12/13/2024    AST 68 (H) 12/13/2024    ALKPHOS 67 12/13/2024    BILITOT 0.6 12/13/2024     FLP  Lab Results   Component Value Date    TRIG 191 (H) 12/13/2024    CHOL 202 (H) 12/13/2024    LDLF 164 (H) 02/26/2021    LDLCALC 114 (H) 12/13/2024    HDL 50.0 12/13/2024     Urine Microalbumin  No results found for: \"MICROALBCREA\"  Weight Management  Wt Readings from Last 3 Encounters:   12/13/24 120 kg (264 lb 9.6 oz)   06/21/24 121 kg (266 lb)   12/18/23 121 kg (266 lb)      There is no height or weight on file to calculate BMI.     Assessment/Plan   Problem List Items Addressed This Visit       Benign essential hypertension    Relevant Orders    Referral to Clinical Pharmacy    Hyperglycemia    Relevant Medications    tirzepatide, weight loss, (Zepbound) 7.5 mg/0.5 mL injection    Other Relevant Orders    Referral to Clinical Pharmacy     Other Visit Diagnoses       Morbid obesity (Multi)        Relevant Medications    tirzepatide, weight loss, (Zepbound) 7.5 mg/0.5 mL injection    Other Relevant Orders    Referral to Clinical Pharmacy            DISCUSSION/PLAN:  Patient started on Zepbound in December 2024 for weight loss and other benefits. She reports occasional constipation - currently manageable with increased fiber. Last bowel movement yesterday per patient. She continues with smaller portion sizes, increased protein intake, more water, and regular exercise. Unfortunately, weight is still about the same and appetite suppression " is minimal. We reviewed Zepbound administration to ensure correct technique. Will increase dose to 7.5 mg and reassess in one month.    INCREASE  Zepbound to 7.5 mg under the skin once weekly on Tuesdays    Future Considerations:  Zepbound dose titration    Monitoring and Education:  Counseled on Zepbound mechanism of action, dosing, storage, side effects, and monitoring  Reviewed Zepbound administration and dose titration schedule  Answered all patient questions      Continue all meds under the continuation of care with the referring provider and clinical pharmacy team.    Clinical Pharmacist follow-up: March 11th, 2025 at 12:20 PM  Orders placed: Zepbound 7.5 mg to [facility] for home delivery    Thank you,   Sindy Walker, PharmD  Clinical Pharmacy Specialist, Primary Care   [phone]    Verbal consent to manage patient's drug therapy was obtained from the patient . Patient was informed she may decline to participate or withdraw from participation in pharmacy services at any time.

## 2025-02-13 ENCOUNTER — PHARMACY VISIT (OUTPATIENT)
Dept: PHARMACY | Facility: CLINIC | Age: 35
End: 2025-02-13
Payer: COMMERCIAL

## 2025-02-23 DIAGNOSIS — F98.8 ATTENTION DEFICIT DISORDER, UNSPECIFIED TYPE: ICD-10-CM

## 2025-02-24 DIAGNOSIS — F98.8 ATTENTION DEFICIT DISORDER, UNSPECIFIED TYPE: ICD-10-CM

## 2025-02-24 RX ORDER — DEXTROAMPHETAMINE SACCHARATE, AMPHETAMINE ASPARTATE MONOHYDRATE, DEXTROAMPHETAMINE SULFATE AND AMPHETAMINE SULFATE 3.75; 3.75; 3.75; 3.75 MG/1; MG/1; MG/1; MG/1
15 CAPSULE, EXTENDED RELEASE ORAL 2 TIMES DAILY
Qty: 60 CAPSULE | Refills: 0 | Status: SHIPPED | OUTPATIENT
Start: 2025-02-24 | End: 2025-03-26

## 2025-03-11 ENCOUNTER — APPOINTMENT (OUTPATIENT)
Dept: PHARMACY | Facility: HOSPITAL | Age: 35
End: 2025-03-11
Payer: COMMERCIAL

## 2025-03-11 DIAGNOSIS — R73.9 HYPERGLYCEMIA: ICD-10-CM

## 2025-03-11 DIAGNOSIS — E66.01 MORBID OBESITY (MULTI): ICD-10-CM

## 2025-03-11 DIAGNOSIS — I10 BENIGN ESSENTIAL HYPERTENSION: ICD-10-CM

## 2025-03-11 PROCEDURE — RXMED WILLOW AMBULATORY MEDICATION CHARGE

## 2025-03-11 NOTE — PROGRESS NOTES
Clinical Pharmacy Appointment    Patient ID: Maame Yen is a 35 y.o. female who presents for Weight Management.    Pt is here for Follow Up appointment.     Referring Provider: Sage Barrera DO  PCP: Sage Barrera DO   Last visit with PCP: 12/13/2024   Next visit with PCP: 6/13/2025     Patient Assistance for Zepbound approved through 12/19/2025. Will have to be renewed prior to that date to prevent lapse in coverage. Medication(s) will be received at no cost to patient from [facility].    INTERVAL HISTORY   Patient's last appointment with clinical pharmacy team on 2/11/2025  Recent hospitalizations? No   Medication changes? No  Missed doses of medications? No   Side effects? Yes  Patient reports occasional nausea with Zepbound  She also endorses heartburn, but unsure if any worse than baseline  Updated relevant labs? No   Changes to diet or exercise regimen? Yes  She has been working out more  She has completely cut out Red Bull and is not snacking between meals      Subjective     HPI  WEIGHT MANAGEMENT  PMH significant for hypertension, fatty liver  Special needs/barriers to therapy: cost/coverage of GLP1s    BMI Readings from Last 1 Encounters:   12/13/24 44.72 kg/m²     Lab Results   Component Value Date    HGBA1C 5.1 12/13/2024    GLUCOSE 80 12/13/2024     Lifestyle  Diet: 2 meals/day  Breakfast: usually skips, protein shakes  Lunch: carrots, snacks  Dinner: small dinner, salad  Snacks: occasional Fritos  Drinks: previously Red Bull, now mostly water  Has worked with nutritionist/dietician? No  Physical Activity: treadmill after dinner for 30-40 minutes three times weekly, walking dogs around the block  Alcohol Use: none     Non-Pharmacological Therapy  Weight loss techniques attempted:  Self-directed dieting: keto, reduced calorie    Pharmacological Therapy  Current Medications:   Zepbound 7.5 mg once weekly    Previous Medications: none for weight loss     Weight Loss  Starting weight:  264 pounds at office visit 12/13/2024  Previous weight unchanged as of 1/14/2025 (on Zepbound 2.5 mg)  Previous weight 262.2 pounds on home scale 2/11/2025 (on Zepbound 5 mg)  Current weight 259.4 pounds on home scale 3/11/2025 (on Zepbound 7.5 mg)    Pertinent PMH Review  PMH of Pancreatitis: No  PMH of Retinopathy: No  PMH of MTC: No     Preventative Care  ASCVD Risk:   The ASCVD Risk score (Corbin MOLINA, et al., 2019) failed to calculate for the following reasons:    The 2019 ASCVD risk score is only valid for ages 40 to 79  On Statin?: No      Medication System Management  Patient's preferred pharmacy: [facility] in [place], [place] or [facility] for delivery  Adherence/Organization: no concerns identified  Affordability/Accessibility: cost/coverage of GLP1s  Insurance coverage of weight-loss medications? No  Plan/benefit exclusion for Wegovy and Zepbound  Eligible for  Patient Assistance Program? Yes, enrolled  Eligible for copay cards/programs? Yes, enrolled      Objective   Allergies   Allergen Reactions    Penicillins Hives     Social History     Social History Narrative    Not on file      Medication Review  Current Outpatient Medications   Medication Instructions    albuterol (Proventil HFA) 90 mcg/actuation inhaler Use 2 puffs every  4-6 hours while awake for next 2 days, then every 4-6 hours needed for cough.    amphetamine-dextroamphetamine XR (Adderall XR) 15 mg 24 hr capsule 15 mg, oral, 2 times daily, Do not crush or chew.    amphetamine-dextroamphetamine XR (Adderall XR) 15 mg 24 hr capsule 15 mg, oral, 2 times daily, Do not crush or chew.    amphetamine-dextroamphetamine XR (Adderall XR) 15 mg 24 hr capsule 15 mg, oral, 2 times daily, Do not crush or chew.    chlorthalidone (HYGROTON) 50 mg, oral, Daily    losartan (COZAAR) 25 mg, oral, Daily    pantoprazole (PROTONIX) 40 mg, oral, Daily before breakfast, Do not crush, chew, or split.    rOPINIRole (Requip) 1 mg tablet 1 tablet, Nightly    tirzepatide (weight  "loss) (ZEPBOUND) 10 mg, subcutaneous, Every 7 days      Vitals  BP Readings from Last 2 Encounters:   12/13/24 120/84   06/21/24 130/80     BMI Readings from Last 1 Encounters:   12/13/24 44.72 kg/m²      Labs  A1C  Lab Results   Component Value Date    HGBA1C 5.1 12/13/2024    HGBA1C 5.5 06/21/2024    HGBA1C 5.6 02/06/2023     BMP  Lab Results   Component Value Date    CALCIUM 9.5 12/13/2024     12/13/2024    K 4.1 12/13/2024    CO2 29 12/13/2024     12/13/2024    BUN 11 12/13/2024    CREATININE 0.64 12/13/2024    EGFR >90 12/13/2024     LFTs  Lab Results   Component Value Date     (H) 12/13/2024    AST 68 (H) 12/13/2024    ALKPHOS 67 12/13/2024    BILITOT 0.6 12/13/2024     FLP  Lab Results   Component Value Date    TRIG 191 (H) 12/13/2024    CHOL 202 (H) 12/13/2024    LDLF 164 (H) 02/26/2021    LDLCALC 114 (H) 12/13/2024    HDL 50.0 12/13/2024     Urine Microalbumin  No results found for: \"MICROALBCREA\"  Weight Management  Wt Readings from Last 3 Encounters:   12/13/24 120 kg (264 lb 9.6 oz)   06/21/24 121 kg (266 lb)   12/18/23 121 kg (266 lb)      There is no height or weight on file to calculate BMI.     Assessment/Plan   Problem List Items Addressed This Visit       Benign essential hypertension    Relevant Orders    Referral to Clinical Pharmacy    Hyperglycemia    Relevant Medications    tirzepatide, weight loss, (Zepbound) 10 mg/0.5 mL injection    Other Relevant Orders    Referral to Clinical Pharmacy     Other Visit Diagnoses       Morbid obesity (Multi)        Relevant Medications    tirzepatide, weight loss, (Zepbound) 10 mg/0.5 mL injection    Other Relevant Orders    Referral to Clinical Pharmacy            DISCUSSION/PLAN:  Patient started on Zepbound in December 2024 for weight loss and other benefits. She has been exercising more and has completely cut out Red Bull. Weight is finally trending down a little, and appetite suppression is more noticeable with the 7.5 mg dose of " Zepbound. Patient endorses occasional nausea and indigestion, but states that heartburn was a problem even before starting Zepbound. We discussed non-pharm strategies to reduce heartburn symptoms. She is having regular bowel movements. Will increase Zepbound dose to 10 mg to maximize weight loss. Follow up in 4 weeks or sooner as needed.    INCREASE  Zepbound to 10 mg under the skin once weekly on Tuesdays    Future Considerations:  Zepbound dose titration    Monitoring and Education:  Counseled on Zepbound mechanism of action, dosing, storage, side effects, and monitoring  Reviewed Zepbound administration and dose titration schedule  Answered all patient questions    Continue all meds under the continuation of care with the referring provider and clinical pharmacy team.    Clinical Pharmacist follow-up: April 8th, 2025 at 12:20 PM  Orders placed: Zepbound 10 mg to [facility] for home delivery    Thank you,   Sindy Walker, PharmD  Clinical Pharmacy Specialist, Primary Care   [phone]    Verbal consent to manage patient's drug therapy was obtained from the patient . Patient was informed she may decline to participate or withdraw from participation in pharmacy services at any time.

## 2025-03-17 ENCOUNTER — PHARMACY VISIT (OUTPATIENT)
Dept: PHARMACY | Facility: CLINIC | Age: 35
End: 2025-03-17
Payer: COMMERCIAL

## 2025-03-24 DIAGNOSIS — F98.8 ATTENTION DEFICIT DISORDER, UNSPECIFIED TYPE: ICD-10-CM

## 2025-03-30 RX ORDER — DEXTROAMPHETAMINE SACCHARATE, AMPHETAMINE ASPARTATE MONOHYDRATE, DEXTROAMPHETAMINE SULFATE AND AMPHETAMINE SULFATE 3.75; 3.75; 3.75; 3.75 MG/1; MG/1; MG/1; MG/1
CAPSULE, EXTENDED RELEASE ORAL
Qty: 60 CAPSULE | Refills: 0 | Status: SHIPPED | OUTPATIENT
Start: 2025-03-30

## 2025-03-31 DIAGNOSIS — F98.8 ATTENTION DEFICIT DISORDER, UNSPECIFIED TYPE: ICD-10-CM

## 2025-03-31 RX ORDER — DEXTROAMPHETAMINE SACCHARATE, AMPHETAMINE ASPARTATE MONOHYDRATE, DEXTROAMPHETAMINE SULFATE AND AMPHETAMINE SULFATE 3.75; 3.75; 3.75; 3.75 MG/1; MG/1; MG/1; MG/1
15 CAPSULE, EXTENDED RELEASE ORAL 2 TIMES DAILY
Qty: 60 CAPSULE | Refills: 0 | Status: SHIPPED | OUTPATIENT
Start: 2025-03-31 | End: 2025-04-30

## 2025-04-08 ENCOUNTER — APPOINTMENT (OUTPATIENT)
Dept: PHARMACY | Facility: HOSPITAL | Age: 35
End: 2025-04-08
Payer: COMMERCIAL

## 2025-04-08 DIAGNOSIS — E66.01 MORBID OBESITY (MULTI): ICD-10-CM

## 2025-04-08 DIAGNOSIS — R73.9 HYPERGLYCEMIA: ICD-10-CM

## 2025-04-08 DIAGNOSIS — I10 BENIGN ESSENTIAL HYPERTENSION: ICD-10-CM

## 2025-04-08 PROCEDURE — RXMED WILLOW AMBULATORY MEDICATION CHARGE

## 2025-04-08 NOTE — PROGRESS NOTES
Clinical Pharmacy Appointment    Patient ID: Maame Yen is a 35 y.o. female who presents for Weight Management.    Pt is here for Follow Up appointment.     Referring Provider: Sage Barrera DO  PCP: Sage Barrera DO   Last visit with PCP: 12/13/2024   Next visit with PCP: 6/13/2025     Patient Assistance for Zepbound approved through 12/19/2025. Will have to be renewed prior to that date to prevent lapse in coverage. Medication(s) will be received at no cost to patient from [facility].    INTERVAL HISTORY   Patient's last appointment with clinical pharmacy team on 3/11/2025  Recent hospitalizations? No   Medication changes? No  Missed doses of medications? No   Side effects? No  Updated relevant labs? No   Changes to diet or exercise regimen? Yes  Patient has increased protein and water intake  She continues with regular exercise      Subjective     HPI  WEIGHT MANAGEMENT  PMH significant for hypertension, fatty liver  Special needs/barriers to therapy: cost/coverage of GLP1s    BMI Readings from Last 1 Encounters:   12/13/24 44.72 kg/m²     Lab Results   Component Value Date    HGBA1C 5.1 12/13/2024    GLUCOSE 80 12/13/2024     Lifestyle  Diet: 2 meals/day  Breakfast: usually skips, protein shakes  Lunch: carrots, snacks  Dinner: small dinner, salad  Snacks: occasional Fritos  Drinks: previously Red Bull, now mostly water  Has worked with nutritionist/dietician? No  Physical Activity: treadmill after dinner for 30-40 minutes three times weekly, walking dogs around the block  Alcohol Use: none     Non-Pharmacological Therapy  Weight loss techniques attempted:  Self-directed dieting: keto, reduced calorie    Pharmacological Therapy  Current Medications:   Zepbound 7.5 mg once weekly    Previous Medications: none for weight loss     Weight Loss  Starting weight: 264 pounds at office visit 12/13/2024  Previous weight unchanged as of 1/14/2025 (on Zepbound 2.5 mg)  Previous weight 262.2 pounds on  home scale 2/11/2025 (on Zepbound 5 mg)  Previous weight 259.4 pounds on home scale 3/11/2025 (on Zepbound 7.5 mg)  Current weight 259 pounds on home scale 4/8/2025 (on Zepbound 10 mg)    Pertinent PMH Review  PMH of Pancreatitis: No  PMH of Retinopathy: No  PMH of MTC: No     Preventative Care  ASCVD Risk:   The ASCVD Risk score (Corbin MOLINA, et al., 2019) failed to calculate for the following reasons:    The 2019 ASCVD risk score is only valid for ages 40 to 79  On Statin?: No      Medication System Management  Patient's preferred pharmacy: [facility] in [place], [place] or [facility] for delivery  Adherence/Organization: no concerns identified  Affordability/Accessibility: cost/coverage of GLP1s  Insurance coverage of weight-loss medications? No  Plan/benefit exclusion for Wegovy and Zepbound  Eligible for  Patient Assistance Program? Yes, enrolled  Eligible for copay cards/programs? Yes, enrolled      Objective   Allergies   Allergen Reactions    Penicillins Hives     Social History     Social History Narrative    Not on file      Medication Review  Current Outpatient Medications   Medication Instructions    albuterol (Proventil HFA) 90 mcg/actuation inhaler Use 2 puffs every  4-6 hours while awake for next 2 days, then every 4-6 hours needed for cough.    amphetamine-dextroamphetamine XR (Adderall XR) 15 mg 24 hr capsule 15 mg, oral, 2 times daily, Do not crush or chew.    amphetamine-dextroamphetamine XR (Adderall XR) 15 mg 24 hr capsule 15 mg, oral, 2 times daily, Do not crush or chew.    amphetamine-dextroamphetamine XR (Adderall XR) 15 mg 24 hr capsule Take 1 capsule by mouth 2 times a day. Do not crush or chew.    amphetamine-dextroamphetamine XR (Adderall XR) 15 mg 24 hr capsule 15 mg, oral, 2 times daily, Do not crush or chew.    chlorthalidone (HYGROTON) 50 mg, oral, Daily    losartan (COZAAR) 25 mg, oral, Daily    pantoprazole (PROTONIX) 40 mg, oral, Daily before breakfast, Do not crush, chew, or split.     "rOPINIRole (Requip) 1 mg tablet 1 tablet, Nightly    tirzepatide (weight loss) (ZEPBOUND) 12.5 mg, subcutaneous, Every 7 days      Vitals  BP Readings from Last 2 Encounters:   12/13/24 120/84   06/21/24 130/80     BMI Readings from Last 1 Encounters:   12/13/24 44.72 kg/m²      Labs  A1C  Lab Results   Component Value Date    HGBA1C 5.1 12/13/2024    HGBA1C 5.5 06/21/2024    HGBA1C 5.6 02/06/2023     BMP  Lab Results   Component Value Date    CALCIUM 9.5 12/13/2024     12/13/2024    K 4.1 12/13/2024    CO2 29 12/13/2024     12/13/2024    BUN 11 12/13/2024    CREATININE 0.64 12/13/2024    EGFR >90 12/13/2024     LFTs  Lab Results   Component Value Date     (H) 12/13/2024    AST 68 (H) 12/13/2024    ALKPHOS 67 12/13/2024    BILITOT 0.6 12/13/2024     FLP  Lab Results   Component Value Date    TRIG 191 (H) 12/13/2024    CHOL 202 (H) 12/13/2024    LDLF 164 (H) 02/26/2021    LDLCALC 114 (H) 12/13/2024    HDL 50.0 12/13/2024     Urine Microalbumin  No results found for: \"MICROALBCREA\"  Weight Management  Wt Readings from Last 3 Encounters:   12/13/24 120 kg (264 lb 9.6 oz)   06/21/24 121 kg (266 lb)   12/18/23 121 kg (266 lb)      There is no height or weight on file to calculate BMI.     Assessment/Plan   Problem List Items Addressed This Visit       Benign essential hypertension    Relevant Orders    Referral to Clinical Pharmacy    Hyperglycemia    Relevant Medications    tirzepatide, weight loss, (Zepbound) 12.5 mg/0.5 mL injection    Other Relevant Orders    Referral to Clinical Pharmacy     Other Visit Diagnoses       Morbid obesity (Multi)        Relevant Medications    tirzepatide, weight loss, (Zepbound) 12.5 mg/0.5 mL injection    Other Relevant Orders    Referral to Clinical Pharmacy            DISCUSSION/PLAN:  Patient started on Zepbound in December 2024 for weight loss and other benefits. She continues with smaller portion sizes and regular exercise, but weight loss has been minimal. We " reviewed injection technique and calorie budget. Patient is currently tracking calories and staying below 1700 per day. We discussed decreasing daily calories to 1500 and/or incorporating more exercise. She is not having any side effects with current Zepbound dose, so will increase to 12.5 mg to maximize weight loss. Follow up in 4 weeks or sooner if needed.    INCREASE  Zepbound to 12.5 mg under the skin once weekly on Tuesdays    Future Considerations:  Zepbound dose titration    Monitoring and Education:  Counseled on Zepbound mechanism of action, dosing, storage, side effects, and monitoring  Reviewed Zepbound administration and dose titration schedule  Answered all patient questions    Continue all meds under the continuation of care with the referring provider and clinical pharmacy team.    Clinical Pharmacist follow-up: May 6th, 2025 at 12:20 PM  Orders placed: Zepbound 12.5 mg to [facility] for home delivery    Thank you,   Sindy Walker, PharmD  Clinical Pharmacy Specialist, Primary Care   [phone]    Verbal consent to manage patient's drug therapy was obtained from the patient . Patient was informed she may decline to participate or withdraw from participation in pharmacy services at any time.

## 2025-04-10 ENCOUNTER — PHARMACY VISIT (OUTPATIENT)
Dept: PHARMACY | Facility: CLINIC | Age: 35
End: 2025-04-10
Payer: COMMERCIAL

## 2025-04-29 DIAGNOSIS — F98.8 ATTENTION DEFICIT DISORDER, UNSPECIFIED TYPE: ICD-10-CM

## 2025-04-30 RX ORDER — DEXTROAMPHETAMINE SACCHARATE, AMPHETAMINE ASPARTATE MONOHYDRATE, DEXTROAMPHETAMINE SULFATE AND AMPHETAMINE SULFATE 3.75; 3.75; 3.75; 3.75 MG/1; MG/1; MG/1; MG/1
CAPSULE, EXTENDED RELEASE ORAL
Qty: 60 CAPSULE | Refills: 0 | Status: SHIPPED | OUTPATIENT
Start: 2025-04-30

## 2025-05-06 ENCOUNTER — APPOINTMENT (OUTPATIENT)
Dept: PHARMACY | Facility: HOSPITAL | Age: 35
End: 2025-05-06
Payer: COMMERCIAL

## 2025-05-06 DIAGNOSIS — E66.01 MORBID OBESITY (MULTI): ICD-10-CM

## 2025-05-06 DIAGNOSIS — I10 BENIGN ESSENTIAL HYPERTENSION: ICD-10-CM

## 2025-05-06 DIAGNOSIS — R73.9 HYPERGLYCEMIA: ICD-10-CM

## 2025-05-06 PROCEDURE — RXMED WILLOW AMBULATORY MEDICATION CHARGE

## 2025-05-06 NOTE — PROGRESS NOTES
Clinical Pharmacy Appointment    Patient ID: Maame Yen is a 35 y.o. female who presents for Weight Management.    Pt is here for Follow Up appointment.     Referring Provider: Sage Barrera DO  PCP: Sage Barrera DO   Last visit with PCP: 12/13/2024   Next visit with PCP: 6/13/2025     Patient Assistance for Zepbound approved through 12/19/2025. Will have to be renewed prior to that date to prevent lapse in coverage. Medication(s) will be received at no cost [facility]    INTERVAL HISTORY   Patient's last appointment with clinical pharmacy team on 4/8/2025  Recent hospitalizations? No   Medication changes? No  Missed doses of medications? No   Side effects? No  Updated relevant labs? No   Changes to diet or exercise regimen? Yes  Patient has increased exercise and decreased daily calories  She continues with increased protein and water intake      Subjective     HPI  WEIGHT MANAGEMENT  PMH significant for hypertension, fatty liver  Special needs/barriers to therapy: cost/coverage of GLP1s    BMI Readings from Last 1 Encounters:   12/13/24 44.72 kg/m²     Lab Results   Component Value Date    HGBA1C 5.1 12/13/2024    GLUCOSE 80 12/13/2024     Lifestyle  Diet: 2 meals/day  Breakfast: usually skips, protein shakes  Lunch: carrots, snacks  Dinner: small dinner, salad  Snacks: occasional Fritos  Drinks: previously Red Bull, now mostly water  Has worked with nutritionist/dietician? No  Physical Activity: treadmill after dinner for 30-40 minutes three times weekly, walking dogs around the block  Alcohol Use: none     Non-Pharmacological Therapy  Weight loss techniques attempted:  Self-directed dieting: keto, reduced calorie    Pharmacological Therapy  Current Medications:   Zepbound 12.5 mg once weekly    Previous Medications: none for weight loss     Weight Loss  Starting weight: 264 pounds at office visit 12/13/2024  Previous weight unchanged as of 1/14/2025 (on Zepbound 2.5  mg)  Previous weight 262.2 pounds on home scale 2/11/2025 (on Zepbound 5 mg)  Previous weight 259.4 pounds on home scale 3/11/2025 (on Zepbound 7.5 mg)  Previous weight 259 pounds on home scale 4/8/2025 (on Zepbound 10 mg)  Current weight 254 pounds on home scale 5/6/2025 (on Zepbound 12.5 mg)    Pertinent PMH Review  PMH of Pancreatitis: No  PMH of Retinopathy: No  PMH of MTC: No     Preventative Care  ASCVD Risk:   The ASCVD Risk score (Corbin MOLINA, et al., 2019) failed to calculate for the following reasons:    The 2019 ASCVD risk score is only valid for ages 40 to 79  On Statin?: No      Medication System Management  Patient's preferred pharmacy: [facility] in [place], [place] or [facility] for delivery  Adherence/Organization: no concerns identified  Affordability/Accessibility: cost/coverage of GLP1s  Insurance coverage of weight-loss medications? No  Plan/benefit exclusion for Wegovy and Zepbound  Eligible for  Patient Assistance Program? Yes, enrolled  Eligible for copay cards/programs? Yes, enrolled      Objective   Allergies   Allergen Reactions    Penicillins Hives     Social History     Social History Narrative    Not on file      Medication Review  Current Outpatient Medications   Medication Instructions    albuterol (Proventil HFA) 90 mcg/actuation inhaler Use 2 puffs every  4-6 hours while awake for next 2 days, then every 4-6 hours needed for cough.    amphetamine-dextroamphetamine XR (Adderall XR) 15 mg 24 hr capsule 15 mg, oral, 2 times daily, Do not crush or chew.    amphetamine-dextroamphetamine XR (Adderall XR) 15 mg 24 hr capsule 15 mg, oral, 2 times daily, Do not crush or chew.    amphetamine-dextroamphetamine XR (Adderall XR) 15 mg 24 hr capsule Take 1 capsule by mouth 2 times a day. Do not crush or chew.    amphetamine-dextroamphetamine XR (Adderall XR) 15 mg 24 hr capsule Take 1 capsule by mouth 2 times a day. Do not crush or chew.    chlorthalidone (HYGROTON) 50 mg, oral, Daily    losartan (COZAAR) 25  "mg, oral, Daily    pantoprazole (PROTONIX) 40 mg, oral, Daily before breakfast, Do not crush, chew, or split.    rOPINIRole (Requip) 1 mg tablet 1 tablet, Nightly    tirzepatide (weight loss) (ZEPBOUND) 12.5 mg, subcutaneous, Every 7 days      Vitals  BP Readings from Last 2 Encounters:   12/13/24 120/84   06/21/24 130/80     BMI Readings from Last 1 Encounters:   12/13/24 44.72 kg/m²      Labs  A1C  Lab Results   Component Value Date    HGBA1C 5.1 12/13/2024    HGBA1C 5.5 06/21/2024    HGBA1C 5.6 02/06/2023     BMP  Lab Results   Component Value Date    CALCIUM 9.5 12/13/2024     12/13/2024    K 4.1 12/13/2024    CO2 29 12/13/2024     12/13/2024    BUN 11 12/13/2024    CREATININE 0.64 12/13/2024    EGFR >90 12/13/2024     LFTs  Lab Results   Component Value Date     (H) 12/13/2024    AST 68 (H) 12/13/2024    ALKPHOS 67 12/13/2024    BILITOT 0.6 12/13/2024     FLP  Lab Results   Component Value Date    TRIG 191 (H) 12/13/2024    CHOL 202 (H) 12/13/2024    LDLF 164 (H) 02/26/2021    LDLCALC 114 (H) 12/13/2024    HDL 50.0 12/13/2024     Urine Microalbumin  No results found for: \"MICROALBCREA\"  Weight Management  Wt Readings from Last 3 Encounters:   12/13/24 120 kg (264 lb 9.6 oz)   06/21/24 121 kg (266 lb)   12/18/23 121 kg (266 lb)      There is no height or weight on file to calculate BMI.     Assessment/Plan   Problem List Items Addressed This Visit       Benign essential hypertension    Relevant Orders    Referral to Clinical Pharmacy    Hyperglycemia    Relevant Medications    tirzepatide, weight loss, (Zepbound) 12.5 mg/0.5 mL injection    Other Relevant Orders    Referral to Clinical Pharmacy     Other Visit Diagnoses         Morbid obesity (Multi)        Relevant Medications    tirzepatide, weight loss, (Zepbound) 12.5 mg/0.5 mL injection    Other Relevant Orders    Referral to Clinical Pharmacy            DISCUSSION/PLAN:  Patient started Zepbound in December 2024 for weight loss and other " benefits. She increased exercise and decreased calorie budget, which has helped facilitate better weight loss results this past month. Patient denies side effects or missed doses at this time. She recently had her period, which is unusual but unclear if related to Zepbound. Will continue current Zepbound dose as she has adequate appetite suppression. Follow up in 4 weeks or sooner if needed.    CONTINUE  Zepbound 12.5 mg under the skin once weekly on Tuesdays    Future Considerations:  Zepbound dose titration    Monitoring and Education:  Counseled on Zepbound mechanism of action, dosing, storage, side effects, and monitoring  Reviewed Zepbound administration and dose titration schedule  Answered all patient questions    Continue all meds under the continuation of care with the referring provider and clinical pharmacy team.    Clinical Pharmacist follow-up: May 6th, 2025 at 12:20 PM  Orders placed: Zepbound 12.5 mg to [facility] for home delivery    Thank you,   Sindy Walker, PharmD  Clinical Pharmacy Specialist, Primary Care   [phone]    Verbal consent to manage patient's drug therapy was obtained from the patient . Patient was informed she may decline to participate or withdraw from participation in pharmacy services at any time.

## 2025-05-06 NOTE — Clinical Note
FYI - Patient recently had her period for the first time in a long time. It looks like you see her next month. Just letting you know in case you want any labs or want her to follow up with OBGYN

## 2025-05-09 ENCOUNTER — PHARMACY VISIT (OUTPATIENT)
Dept: PHARMACY | Facility: CLINIC | Age: 35
End: 2025-05-09
Payer: COMMERCIAL

## 2025-05-30 DIAGNOSIS — E66.01 MORBID OBESITY (MULTI): ICD-10-CM

## 2025-05-30 DIAGNOSIS — R73.9 HYPERGLYCEMIA: ICD-10-CM

## 2025-05-30 RX ORDER — TIRZEPATIDE 12.5 MG/.5ML
12.5 INJECTION, SOLUTION SUBCUTANEOUS
Qty: 2 ML | Refills: 0 | OUTPATIENT
Start: 2025-05-30

## 2025-06-03 ENCOUNTER — APPOINTMENT (OUTPATIENT)
Dept: PHARMACY | Facility: HOSPITAL | Age: 35
End: 2025-06-03
Payer: COMMERCIAL

## 2025-06-03 DIAGNOSIS — E66.01 MORBID OBESITY (MULTI): ICD-10-CM

## 2025-06-03 DIAGNOSIS — R73.9 HYPERGLYCEMIA: ICD-10-CM

## 2025-06-03 DIAGNOSIS — I10 BENIGN ESSENTIAL HYPERTENSION: ICD-10-CM

## 2025-06-03 PROCEDURE — RXMED WILLOW AMBULATORY MEDICATION CHARGE

## 2025-06-03 NOTE — PROGRESS NOTES
Clinical Pharmacy Appointment    Patient ID: Maame Yen is a 35 y.o. female who presents for Weight Management.    Pt is here for Follow Up appointment.     Referring Provider: Sage Barrera DO  PCP: Sage Barrera DO   Last visit with PCP: 12/13/2024   Next visit with PCP: 6/13/2025     Patient Assistance for Zepbound approved through 12/19/2025. Will have to be renewed prior to that date to prevent lapse in coverage. Medication(s) will be received at no cost to patient from [facility].    INTERVAL HISTORY   Patient's last appointment with clinical pharmacy team on 5/6/2025  Recent hospitalizations? No   Medication changes? No  Missed doses of medications? No   Side effects? No  Updated relevant labs? No   Changes to diet or exercise regimen? Yes  Increased exercise and outside activities      Subjective     HPI  WEIGHT MANAGEMENT  PMH significant for hypertension, fatty liver  Special needs/barriers to therapy: cost/coverage of GLP1s    BMI Readings from Last 1 Encounters:   12/13/24 44.72 kg/m²     Lab Results   Component Value Date    HGBA1C 5.1 12/13/2024    GLUCOSE 80 12/13/2024     Lifestyle  Diet: 2-3 meals/day  Breakfast: protein shakes  Lunch: carrots, snacks  Dinner: small dinner, salad  Snacks: cottage cheese with fruit  Drinks: previously Red Bull, now mostly water  Has worked with nutritionist/dietician? No  Physical Activity: treadmill after dinner for 30-40 minutes three times weekly, walking dogs around the block  Alcohol Use: none     Non-Pharmacological Therapy  Weight loss techniques attempted:  Self-directed dieting: keto, reduced calorie    Pharmacological Therapy  Current Medications:   Zepbound 12.5 mg once weekly    Previous Medications: none for weight loss     Weight Loss  Starting weight: 264 pounds at office visit 12/13/2024  Previous weight unchanged as of 1/14/2025 (on Zepbound 2.5 mg)  Previous weight 262.2 pounds on home scale 2/11/2025 (on Zepbound 5  mg)  Previous weight 259.4 pounds on home scale 3/11/2025 (on Zepbound 7.5 mg)  Previous weight 259 pounds on home scale 4/8/2025 (on Zepbound 10 mg)  Previous weight 254 pounds on home scale 5/6/2025 (on Zepbound 12.5 mg)  Current weight 248 pounds on home scale 6/3/2025 (on Zepbound 12.5 mg)    Pertinent PMH Review  PMH of Pancreatitis: No  PMH of Retinopathy: No  PMH of MTC: No     Preventative Care  ASCVD Risk:   The ASCVD Risk score (Corbin MOLINA, et al., 2019) failed to calculate for the following reasons:    The 2019 ASCVD risk score is only valid for ages 40 to 79  On Statin?: No      Medication System Management  Patient's preferred pharmacy: [facility] in [place], [place] or [facility] for delivery  Adherence/Organization: no concerns identified  Affordability/Accessibility: cost/coverage of GLP1s  Insurance coverage of weight-loss medications? No  Plan/benefit exclusion for Wegovy and Zepbound  Eligible for  Patient Assistance Program? Yes, enrolled  Eligible for copay cards/programs? Yes, enrolled      Objective   Allergies   Allergen Reactions    Penicillins Hives     Social History     Social History Narrative    Not on file      Medication Review  Current Outpatient Medications   Medication Instructions    albuterol (Proventil HFA) 90 mcg/actuation inhaler Use 2 puffs every  4-6 hours while awake for next 2 days, then every 4-6 hours needed for cough.    amphetamine-dextroamphetamine XR (Adderall XR) 15 mg 24 hr capsule 15 mg, oral, 2 times daily, Do not crush or chew.    amphetamine-dextroamphetamine XR (Adderall XR) 15 mg 24 hr capsule 15 mg, oral, 2 times daily, Do not crush or chew.    amphetamine-dextroamphetamine XR (Adderall XR) 15 mg 24 hr capsule Take 1 capsule by mouth 2 times a day. Do not crush or chew.    amphetamine-dextroamphetamine XR (Adderall XR) 15 mg 24 hr capsule Take 1 capsule by mouth 2 times a day. Do not crush or chew.    chlorthalidone (HYGROTON) 50 mg, oral, Daily    losartan (COZAAR) 25  "mg, oral, Daily    pantoprazole (PROTONIX) 40 mg, oral, Daily before breakfast, Do not crush, chew, or split.    rOPINIRole (Requip) 1 mg tablet 1 tablet, Nightly    tirzepatide (weight loss) (ZEPBOUND) 12.5 mg, subcutaneous, Every 7 days      Vitals  BP Readings from Last 2 Encounters:   12/13/24 120/84   06/21/24 130/80     BMI Readings from Last 1 Encounters:   12/13/24 44.72 kg/m²      Labs  A1C  Lab Results   Component Value Date    HGBA1C 5.1 12/13/2024    HGBA1C 5.5 06/21/2024    HGBA1C 5.6 02/06/2023     BMP  Lab Results   Component Value Date    CALCIUM 9.5 12/13/2024     12/13/2024    K 4.1 12/13/2024    CO2 29 12/13/2024     12/13/2024    BUN 11 12/13/2024    CREATININE 0.64 12/13/2024    EGFR >90 12/13/2024     LFTs  Lab Results   Component Value Date     (H) 12/13/2024    AST 68 (H) 12/13/2024    ALKPHOS 67 12/13/2024    BILITOT 0.6 12/13/2024     FLP  Lab Results   Component Value Date    TRIG 191 (H) 12/13/2024    CHOL 202 (H) 12/13/2024    LDLF 164 (H) 02/26/2021    LDLCALC 114 (H) 12/13/2024    HDL 50.0 12/13/2024     Urine Microalbumin  No results found for: \"MICROALBCREA\"  Weight Management  Wt Readings from Last 3 Encounters:   12/13/24 120 kg (264 lb 9.6 oz)   06/21/24 121 kg (266 lb)   12/18/23 121 kg (266 lb)      There is no height or weight on file to calculate BMI.     Assessment/Plan   Problem List Items Addressed This Visit       Benign essential hypertension    Relevant Orders    Referral to Clinical Pharmacy    Hyperglycemia    Relevant Medications    tirzepatide, weight loss, (Zepbound) 12.5 mg/0.5 mL injection    Other Relevant Orders    Referral to Clinical Pharmacy     Other Visit Diagnoses         Morbid obesity (Multi)        Relevant Medications    tirzepatide, weight loss, (Zepbound) 12.5 mg/0.5 mL injection    Other Relevant Orders    Referral to Clinical Pharmacy            DISCUSSION/PLAN:  Patient started Zepbound in December 2024 for weight loss and other " benefits. She denies significant side effects or missed doses at this time. After a slow start, weight is trending down at a steady pace. She has been more active with nicer weather and continues with emphasis on protein intake. She is drinking plenty of water and having regular bowel movements. Will continue current Zepbound dose as she has adequate appetite suppression. Follow up in 8 weeks or sooner if needed.    CONTINUE  Zepbound 12.5 mg under the skin once weekly on Tuesdays    Future Considerations:  Zepbound dose titration    Monitoring and Education:  Counseled on Zepbound mechanism of action, dosing, storage, side effects, and monitoring  Reviewed Zepbound administration and dose titration schedule  Answered all patient questions    Continue all meds under the continuation of care with the referring provider and clinical pharmacy team.    Clinical Pharmacist follow-up: July 29th, 2025 at 12:20 PM  Orders placed: Zepbound 12.5 mg to [facility] for home delivery    Thank you,   Sindy Walker, PharmD  Clinical Pharmacy Specialist, Primary Care   [phone]    Verbal consent to manage patient's drug therapy was obtained from the patient . Patient was informed she may decline to participate or withdraw from participation in pharmacy services at any time.

## 2025-06-04 ENCOUNTER — PHARMACY VISIT (OUTPATIENT)
Dept: PHARMACY | Facility: CLINIC | Age: 35
End: 2025-06-04
Payer: COMMERCIAL

## 2025-06-04 DIAGNOSIS — F98.8 ATTENTION DEFICIT DISORDER, UNSPECIFIED TYPE: ICD-10-CM

## 2025-06-05 DIAGNOSIS — F98.8 ATTENTION DEFICIT DISORDER, UNSPECIFIED TYPE: ICD-10-CM

## 2025-06-06 DIAGNOSIS — F98.8 ATTENTION DEFICIT DISORDER, UNSPECIFIED TYPE: ICD-10-CM

## 2025-06-06 RX ORDER — DEXTROAMPHETAMINE SACCHARATE, AMPHETAMINE ASPARTATE MONOHYDRATE, DEXTROAMPHETAMINE SULFATE AND AMPHETAMINE SULFATE 3.75; 3.75; 3.75; 3.75 MG/1; MG/1; MG/1; MG/1
CAPSULE, EXTENDED RELEASE ORAL
Qty: 60 CAPSULE | Refills: 0 | Status: SHIPPED | OUTPATIENT
Start: 2025-06-06

## 2025-06-06 RX ORDER — DEXTROAMPHETAMINE SACCHARATE, AMPHETAMINE ASPARTATE MONOHYDRATE, DEXTROAMPHETAMINE SULFATE AND AMPHETAMINE SULFATE 3.75; 3.75; 3.75; 3.75 MG/1; MG/1; MG/1; MG/1
CAPSULE, EXTENDED RELEASE ORAL
Qty: 60 CAPSULE | Refills: 0 | OUTPATIENT
Start: 2025-06-06

## 2025-06-09 DIAGNOSIS — F98.8 ATTENTION DEFICIT DISORDER, UNSPECIFIED TYPE: Primary | ICD-10-CM

## 2025-06-09 RX ORDER — DEXTROAMPHETAMINE SACCHARATE, AMPHETAMINE ASPARTATE MONOHYDRATE, DEXTROAMPHETAMINE SULFATE AND AMPHETAMINE SULFATE 3.75; 3.75; 3.75; 3.75 MG/1; MG/1; MG/1; MG/1
15 CAPSULE, EXTENDED RELEASE ORAL 2 TIMES DAILY
Qty: 60 CAPSULE | Refills: 0 | Status: SHIPPED | OUTPATIENT
Start: 2025-06-09 | End: 2025-07-09

## 2025-06-09 RX ORDER — DEXTROAMPHETAMINE SACCHARATE, AMPHETAMINE ASPARTATE MONOHYDRATE, DEXTROAMPHETAMINE SULFATE AND AMPHETAMINE SULFATE 3.75; 3.75; 3.75; 3.75 MG/1; MG/1; MG/1; MG/1
15 CAPSULE, EXTENDED RELEASE ORAL 2 TIMES DAILY
Qty: 60 CAPSULE | Refills: 0 | Status: SHIPPED | OUTPATIENT
Start: 2025-08-08 | End: 2025-09-07

## 2025-06-09 RX ORDER — DEXTROAMPHETAMINE SACCHARATE, AMPHETAMINE ASPARTATE MONOHYDRATE, DEXTROAMPHETAMINE SULFATE AND AMPHETAMINE SULFATE 3.75; 3.75; 3.75; 3.75 MG/1; MG/1; MG/1; MG/1
15 CAPSULE, EXTENDED RELEASE ORAL 2 TIMES DAILY
Qty: 60 CAPSULE | Refills: 0 | Status: SHIPPED | OUTPATIENT
Start: 2025-07-09 | End: 2025-08-08

## 2025-06-11 RX ORDER — DEXTROAMPHETAMINE SACCHARATE, AMPHETAMINE ASPARTATE MONOHYDRATE, DEXTROAMPHETAMINE SULFATE AND AMPHETAMINE SULFATE 3.75; 3.75; 3.75; 3.75 MG/1; MG/1; MG/1; MG/1
CAPSULE, EXTENDED RELEASE ORAL
Qty: 60 CAPSULE | Refills: 0 | OUTPATIENT
Start: 2025-06-11

## 2025-06-13 ENCOUNTER — APPOINTMENT (OUTPATIENT)
Dept: PRIMARY CARE | Facility: CLINIC | Age: 35
End: 2025-06-13
Payer: COMMERCIAL

## 2025-06-13 VITALS
SYSTOLIC BLOOD PRESSURE: 120 MMHG | TEMPERATURE: 97.9 F | DIASTOLIC BLOOD PRESSURE: 80 MMHG | WEIGHT: 247.2 LBS | BODY MASS INDEX: 41.78 KG/M2

## 2025-06-13 DIAGNOSIS — K21.9 CHRONIC GERD: ICD-10-CM

## 2025-06-13 DIAGNOSIS — E55.9 VITAMIN D DEFICIENCY: ICD-10-CM

## 2025-06-13 DIAGNOSIS — F41.9 ANXIETY: ICD-10-CM

## 2025-06-13 DIAGNOSIS — I10 BENIGN ESSENTIAL HYPERTENSION: ICD-10-CM

## 2025-06-13 DIAGNOSIS — F98.8 ATTENTION DEFICIT DISORDER (ADD) WITHOUT HYPERACTIVITY: Primary | ICD-10-CM

## 2025-06-13 DIAGNOSIS — K76.0 FATTY LIVER: ICD-10-CM

## 2025-06-13 DIAGNOSIS — R73.9 HYPERGLYCEMIA: ICD-10-CM

## 2025-06-13 PROCEDURE — 3079F DIAST BP 80-89 MM HG: CPT | Performed by: FAMILY MEDICINE

## 2025-06-13 PROCEDURE — 3074F SYST BP LT 130 MM HG: CPT | Performed by: FAMILY MEDICINE

## 2025-06-13 PROCEDURE — 99214 OFFICE O/P EST MOD 30 MIN: CPT | Performed by: FAMILY MEDICINE

## 2025-06-13 ASSESSMENT — ENCOUNTER SYMPTOMS
PALPITATIONS: 0
APPETITE CHANGE: 0
HEADACHES: 0
SLEEP DISTURBANCE: 0
VOMITING: 0
NAUSEA: 0
CONSTIPATION: 0
DIARRHEA: 0
JOINT SWELLING: 0
EYE PAIN: 0
SHORTNESS OF BREATH: 0
DYSURIA: 0
BLOOD IN STOOL: 0
DYSPHORIC MOOD: 0
NERVOUS/ANXIOUS: 0
COUGH: 0
DIZZINESS: 0
FATIGUE: 0
ARTHRALGIAS: 0
LIGHT-HEADEDNESS: 0
ABDOMINAL PAIN: 0
WOUND: 0
MYALGIAS: 0
SINUS PRESSURE: 0
ACTIVITY CHANGE: 0

## 2025-06-13 ASSESSMENT — PATIENT HEALTH QUESTIONNAIRE - PHQ9
1. LITTLE INTEREST OR PLEASURE IN DOING THINGS: NOT AT ALL
SUM OF ALL RESPONSES TO PHQ9 QUESTIONS 1 AND 2: 0
2. FEELING DOWN, DEPRESSED OR HOPELESS: NOT AT ALL

## 2025-06-14 LAB
25(OH)D3+25(OH)D2 SERPL-MCNC: 29 NG/ML (ref 30–100)
ALBUMIN SERPL-MCNC: 5.1 G/DL (ref 3.6–5.1)
ALP SERPL-CCNC: 68 U/L (ref 31–125)
ALT SERPL-CCNC: 30 U/L (ref 6–29)
ANION GAP SERPL CALCULATED.4IONS-SCNC: 12 MMOL/L (CALC) (ref 7–17)
AST SERPL-CCNC: 20 U/L (ref 10–30)
BASOPHILS # BLD AUTO: 78 CELLS/UL (ref 0–200)
BASOPHILS NFR BLD AUTO: 1 %
BILIRUB SERPL-MCNC: 0.4 MG/DL (ref 0.2–1.2)
BUN SERPL-MCNC: 17 MG/DL (ref 7–25)
CALCIUM SERPL-MCNC: 9.9 MG/DL (ref 8.6–10.2)
CHLORIDE SERPL-SCNC: 104 MMOL/L (ref 98–110)
CHOLEST SERPL-MCNC: 214 MG/DL
CHOLEST/HDLC SERPL: 4.2 (CALC)
CO2 SERPL-SCNC: 24 MMOL/L (ref 20–32)
CREAT SERPL-MCNC: 0.58 MG/DL (ref 0.5–0.97)
EGFRCR SERPLBLD CKD-EPI 2021: 119 ML/MIN/1.73M2
EOSINOPHIL # BLD AUTO: 328 CELLS/UL (ref 15–500)
EOSINOPHIL NFR BLD AUTO: 4.2 %
ERYTHROCYTE [DISTWIDTH] IN BLOOD BY AUTOMATED COUNT: 12.8 % (ref 11–15)
EST. AVERAGE GLUCOSE BLD GHB EST-MCNC: 100 MG/DL
EST. AVERAGE GLUCOSE BLD GHB EST-SCNC: 5.5 MMOL/L
GLUCOSE SERPL-MCNC: 83 MG/DL (ref 65–99)
HBA1C MFR BLD: 5.1 %
HCT VFR BLD AUTO: 49.9 % (ref 35–45)
HDLC SERPL-MCNC: 51 MG/DL
HGB BLD-MCNC: 16.5 G/DL (ref 11.7–15.5)
LDLC SERPL CALC-MCNC: 130 MG/DL (CALC)
LYMPHOCYTES # BLD AUTO: 3182 CELLS/UL (ref 850–3900)
LYMPHOCYTES NFR BLD AUTO: 40.8 %
MCH RBC QN AUTO: 31.9 PG (ref 27–33)
MCHC RBC AUTO-ENTMCNC: 33.1 G/DL (ref 32–36)
MCV RBC AUTO: 96.3 FL (ref 80–100)
MONOCYTES # BLD AUTO: 647 CELLS/UL (ref 200–950)
MONOCYTES NFR BLD AUTO: 8.3 %
NEUTROPHILS # BLD AUTO: 3565 CELLS/UL (ref 1500–7800)
NEUTROPHILS NFR BLD AUTO: 45.7 %
NONHDLC SERPL-MCNC: 163 MG/DL (CALC)
PLATELET # BLD AUTO: 307 THOUSAND/UL (ref 140–400)
PMV BLD REES-ECKER: 9.9 FL (ref 7.5–12.5)
POTASSIUM SERPL-SCNC: 4.1 MMOL/L (ref 3.5–5.3)
PROT SERPL-MCNC: 7.7 G/DL (ref 6.1–8.1)
RBC # BLD AUTO: 5.18 MILLION/UL (ref 3.8–5.1)
SODIUM SERPL-SCNC: 140 MMOL/L (ref 135–146)
TRIGL SERPL-MCNC: 193 MG/DL
WBC # BLD AUTO: 7.8 THOUSAND/UL (ref 3.8–10.8)

## 2025-06-18 ENCOUNTER — TELEPHONE (OUTPATIENT)
Dept: PRIMARY CARE | Facility: CLINIC | Age: 35
End: 2025-06-18
Payer: COMMERCIAL

## 2025-06-18 DIAGNOSIS — R71.8 ELEVATED HEMATOCRIT: Primary | ICD-10-CM

## 2025-06-18 NOTE — TELEPHONE ENCOUNTER
----- Message from Sage Barrera sent at 6/18/2025 12:47 PM EDT -----  Let patient know her blood is a little thick.  I would like her to repeat a CBC not fasting well-hydrated in about a week or 2 order is in  Her cholesterol has not changed much since 6 months ago but continue to work on healthy habits  Kidneys liver and electrolytes overall good  Vitamin D slightly low so if she is not currently taking a supplement add vitamin D3 1000 IUs daily.  If she is taking 1 increase by this amount    ##DO NOT CLOSE UNTIL YOU SPEAK TO PATIENT##    ----- Message -----  From: Lance, [facility] Results In  Sent: 6/13/2025  10:19 PM EDT  To: Sage Barrera, DO

## 2025-06-27 PROCEDURE — RXMED WILLOW AMBULATORY MEDICATION CHARGE

## 2025-06-28 ENCOUNTER — PHARMACY VISIT (OUTPATIENT)
Dept: PHARMACY | Facility: CLINIC | Age: 35
End: 2025-06-28
Payer: COMMERCIAL

## 2025-07-25 DIAGNOSIS — E66.01 MORBID OBESITY (MULTI): ICD-10-CM

## 2025-07-25 DIAGNOSIS — R73.9 HYPERGLYCEMIA: ICD-10-CM

## 2025-07-25 RX ORDER — TIRZEPATIDE 12.5 MG/.5ML
12.5 INJECTION, SOLUTION SUBCUTANEOUS
Qty: 2 ML | Refills: 1 | OUTPATIENT
Start: 2025-07-25

## 2025-07-29 ENCOUNTER — APPOINTMENT (OUTPATIENT)
Dept: PHARMACY | Facility: HOSPITAL | Age: 35
End: 2025-07-29
Payer: COMMERCIAL

## 2025-07-29 DIAGNOSIS — I10 BENIGN ESSENTIAL HYPERTENSION: ICD-10-CM

## 2025-07-29 DIAGNOSIS — R73.9 HYPERGLYCEMIA: ICD-10-CM

## 2025-07-29 DIAGNOSIS — E66.01 MORBID OBESITY (MULTI): Primary | ICD-10-CM

## 2025-07-29 PROCEDURE — RXMED WILLOW AMBULATORY MEDICATION CHARGE

## 2025-07-29 NOTE — PROGRESS NOTES
Clinical Pharmacy Appointment    Patient ID: Maame Yen is a 35 y.o. female who presents for Weight Management.    Pt is here for Follow Up appointment.     Referring Provider: Sage Barrera DO  PCP: Sage Barrera DO   Last visit with PCP: 6/13/2025  Next visit with PCP: 12/19/2025     Patient Assistance for Zepbound approved through 12/19/2025. Will have to be renewed prior to that date to prevent lapse in coverage. Medication(s) will be received at no cost to patient from [facility].    INTERVAL HISTORY   Patient's last appointment with clinical pharmacy team on 6/3/2025  Recent hospitalizations? No   Medication changes? No  Missed doses of medications? No   Side effects? No  Updated relevant labs? Yes  A1c and CMP stable, lipid panel relatively unchanged  Changes to diet or exercise regimen? No      Subjective     HPI  WEIGHT MANAGEMENT  PMH significant for hypertension, fatty liver  Special needs/barriers to therapy: cost/coverage of GLP1s    BMI Readings from Last 1 Encounters:   06/13/25 41.78 kg/m²     Lab Results   Component Value Date    HGBA1C 5.1 06/13/2025    GLUCOSE 83 06/13/2025     Lifestyle  Diet: 2-3 meals/day  Breakfast: protein shakes  Lunch: carrots, snacks  Dinner: small dinner, salad  Snacks: cottage cheese with fruit  Drinks: previously Red Bull, now mostly water  Has worked with nutritionist/dietician? No  Physical Activity: treadmill after dinner for 30-40 minutes three times weekly, walking dogs around the block  Alcohol Use: none     Non-Pharmacological Therapy  Weight loss techniques attempted:  Self-directed dieting: keto, reduced calorie    Pharmacological Therapy  Current Medications:   Zepbound 12.5 mg once weekly    Previous Medications: none for weight loss     Weight Loss  Starting weight: 264 pounds at office visit 12/13/2024  Previous weight unchanged as of 1/14/2025 (on Zepbound 2.5 mg)  Previous weight 262.2 pounds on home scale 2/11/2025 (on Zepbound 5  mg)  Previous weight 259.4 pounds on home scale 3/11/2025 (on Zepbound 7.5 mg)  Previous weight 259 pounds on home scale 4/8/2025 (on Zepbound 10 mg)  Previous weight 254 pounds on home scale 5/6/2025 (on Zepbound 12.5 mg)  Previous weight 248 pounds on home scale 6/3/2025 (on Zepbound 12.5 mg)  Current weight 246 pounds on home scale 7/29/2025 (on Zepbound 12.5 mg)    Pertinent PMH Review  PMH of Pancreatitis: No  PMH of Retinopathy: No  PMH of MTC: No     Preventative Care  ASCVD Risk:   The ASCVD Risk score (Corbin MOLINA, et al., 2019) failed to calculate for the following reasons:    The 2019 ASCVD risk score is only valid for ages 40 to 79  On Statin?: No      Medication System Management  Patient's preferred pharmacy: [facility] in [place], [place] or [facility] for delivery  Adherence/Organization: no concerns identified  Affordability/Accessibility: cost/coverage of GLP1s  Eligible for  Patient Assistance Program? Yes, enrolled  Eligible for copay cards/programs? Yes, enrolled      Objective   Allergies   Allergen Reactions    Penicillins Hives     Social History     Social History Narrative    Not on file      Medication Review  Current Outpatient Medications   Medication Instructions    albuterol (Proventil HFA) 90 mcg/actuation inhaler Use 2 puffs every  4-6 hours while awake for next 2 days, then every 4-6 hours needed for cough.    amphetamine-dextroamphetamine XR (Adderall XR) 15 mg 24 hr capsule 15 mg, oral, 2 times daily, Do not crush or chew.    amphetamine-dextroamphetamine XR (Adderall XR) 15 mg 24 hr capsule 15 mg, oral, 2 times daily, Do not crush or chew.    [START ON 8/8/2025] amphetamine-dextroamphetamine XR (Adderall XR) 15 mg 24 hr capsule 15 mg, oral, 2 times daily, Do not crush or chew.    chlorthalidone (HYGROTON) 50 mg, oral, Daily    losartan (COZAAR) 25 mg, oral, Daily    pantoprazole (PROTONIX) 40 mg, oral, Daily before breakfast, Do not crush, chew, or split.    rOPINIRole (Requip) 1 mg tablet 1  "tablet, Nightly    tirzepatide (weight loss) (ZEPBOUND) 15 mg, subcutaneous, Every 7 days      Vitals  BP Readings from Last 2 Encounters:   06/13/25 120/80   12/13/24 120/84     BMI Readings from Last 1 Encounters:   06/13/25 41.78 kg/m²      Labs  A1C  Lab Results   Component Value Date    HGBA1C 5.1 06/13/2025    HGBA1C 5.1 12/13/2024    HGBA1C 5.5 06/21/2024     BMP  Lab Results   Component Value Date    CALCIUM 9.9 06/13/2025     06/13/2025    K 4.1 06/13/2025    CO2 24 06/13/2025     06/13/2025    BUN 17 06/13/2025    CREATININE 0.58 06/13/2025    EGFR 119 06/13/2025     LFTs  Lab Results   Component Value Date    ALT 30 (H) 06/13/2025    AST 20 06/13/2025    ALKPHOS 68 06/13/2025    BILITOT 0.4 06/13/2025     FLP  Lab Results   Component Value Date    TRIG 193 (H) 06/13/2025    CHOL 214 (H) 06/13/2025    LDLF 164 (H) 02/26/2021    LDLCALC 130 (H) 06/13/2025    HDL 51 06/13/2025     Urine Microalbumin  No results found for: \"MICROALBCREA\"  Weight Management  Wt Readings from Last 3 Encounters:   06/13/25 112 kg (247 lb 3.2 oz)   12/13/24 120 kg (264 lb 9.6 oz)   06/21/24 121 kg (266 lb)      There is no height or weight on file to calculate BMI.     Assessment/Plan   Problem List Items Addressed This Visit       Benign essential hypertension    Relevant Orders    Referral to Clinical Pharmacy    Hyperglycemia    Relevant Medications    tirzepatide, weight loss, (Zepbound) 15 mg/0.5 mL injection    Other Relevant Orders    Referral to Clinical Pharmacy     Other Visit Diagnoses         Morbid obesity (Multi)    -  Primary    Relevant Medications    tirzepatide, weight loss, (Zepbound) 15 mg/0.5 mL injection    Other Relevant Orders    Referral to Clinical Pharmacy            DISCUSSION/PLAN:  Patient started Zepbound in December 2024 for weight loss and other benefits. She denies significant side effects or missed doses at this time. She continues with better diet choices and increased physical " activity, but weight loss progress has slowed down again. Will increase Zepbound dose to 15 mg for improved weight loss potential as she is tolerating well. Follow up in 4 weeks or sooner if needed.    INCREASE  Zepbound to 15 mg under the skin once weekly on Tuesdays    Monitoring and Education:  Counseled on Zepbound mechanism of action, dosing, storage, side effects, and monitoring  Reviewed Zepbound administration and dose titration schedule  Answered all patient questions    Continue all meds under the continuation of care with the referring provider and clinical pharmacy team.    Clinical Pharmacist follow-up: August 22nd, 2025 at 10:20 AM  Orders placed: Zepbound 15 mg to [facility] for home delivery    Thank you,   Sindy Walker, PharmD  Clinical Pharmacy Specialist, Primary Care   [phone]    Verbal consent to manage patient's drug therapy was obtained from the patient . Patient was informed she may decline to participate or withdraw from participation in pharmacy services at any time.

## 2025-07-31 ENCOUNTER — PHARMACY VISIT (OUTPATIENT)
Dept: PHARMACY | Facility: CLINIC | Age: 35
End: 2025-07-31
Payer: COMMERCIAL

## 2025-08-21 DIAGNOSIS — E66.01 MORBID OBESITY (MULTI): ICD-10-CM

## 2025-08-21 DIAGNOSIS — R73.9 HYPERGLYCEMIA: ICD-10-CM

## 2025-08-21 RX ORDER — TIRZEPATIDE 15 MG/.5ML
15 INJECTION, SOLUTION SUBCUTANEOUS
Qty: 2 ML | Refills: 0 | OUTPATIENT
Start: 2025-08-21

## 2025-08-22 ENCOUNTER — APPOINTMENT (OUTPATIENT)
Dept: PHARMACY | Facility: HOSPITAL | Age: 35
End: 2025-08-22
Payer: COMMERCIAL

## 2025-08-22 ENCOUNTER — PHARMACY VISIT (OUTPATIENT)
Dept: PHARMACY | Facility: CLINIC | Age: 35
End: 2025-08-22
Payer: COMMERCIAL

## 2025-08-22 DIAGNOSIS — I10 BENIGN ESSENTIAL HYPERTENSION: ICD-10-CM

## 2025-08-22 DIAGNOSIS — R73.9 HYPERGLYCEMIA: ICD-10-CM

## 2025-08-22 DIAGNOSIS — E66.01 MORBID OBESITY (MULTI): ICD-10-CM

## 2025-08-22 PROCEDURE — RXMED WILLOW AMBULATORY MEDICATION CHARGE

## 2025-10-17 ENCOUNTER — APPOINTMENT (OUTPATIENT)
Dept: PHARMACY | Facility: HOSPITAL | Age: 35
End: 2025-10-17
Payer: COMMERCIAL

## 2025-12-18 ENCOUNTER — APPOINTMENT (OUTPATIENT)
Dept: PRIMARY CARE | Facility: CLINIC | Age: 35
End: 2025-12-18
Payer: COMMERCIAL

## 2025-12-19 ENCOUNTER — APPOINTMENT (OUTPATIENT)
Dept: PRIMARY CARE | Facility: CLINIC | Age: 35
End: 2025-12-19
Payer: COMMERCIAL